# Patient Record
Sex: FEMALE | Race: WHITE | NOT HISPANIC OR LATINO | Employment: FULL TIME | URBAN - METROPOLITAN AREA
[De-identification: names, ages, dates, MRNs, and addresses within clinical notes are randomized per-mention and may not be internally consistent; named-entity substitution may affect disease eponyms.]

---

## 2017-03-24 ENCOUNTER — PRENATAL OFFICE VISIT (OUTPATIENT)
Dept: OBGYN | Facility: CLINIC | Age: 27
End: 2017-03-24
Payer: COMMERCIAL

## 2017-03-24 DIAGNOSIS — Z34.80 PRENATAL CARE, SUBSEQUENT PREGNANCY: Primary | ICD-10-CM

## 2017-03-24 PROCEDURE — 99207 ZZC NO CHARGE NURSE ONLY: CPT | Performed by: OBSTETRICS & GYNECOLOGY

## 2017-03-24 NOTE — MR AVS SNAPSHOT
After Visit Summary   3/24/2017    Jocelyn Causey    MRN: [id number]           Patient Information     Date Of Birth          1990        Visit Information        Provider Department      3/24/2017 3:08 PM Claudia Harvey MD [facility]        Today's Diagnoses     Prenatal care, subsequent pregnancy    -  1       Follow-ups after your visit        Your next 10 appointments already scheduled     Apr 27, 2017  9:30 AM CDT   New Prenatal with Claudia Harvey MD, [facility]   [facility] ([facility])    [address] [facility] [address]   [phone]              Who to contact     If you have questions or need follow up information about today's clinic visit or your schedule please contact [facility] directly at [phone].  Normal or non-critical lab and imaging results will be communicated to you by MyChart, letter or phone within 4 business days after the clinic has received the results. If you do not hear from us within 7 days, please contact the clinic through MyChart or phone. If you have a critical or abnormal lab result, we will notify you by phone as soon as possible.  Submit refill requests through Emefcy or call your pharmacy and they will forward the refill request to us. Please allow 3 business days for your refill to be completed.          Additional Information About Your Visit        MyChart Information     Emefcy gives you secure access to your electronic health record. If you see a primary care provider, you can also send messages to your care team and make appointments. If you have questions, please call your primary care clinic.  If you do not have a primary care provider, please call [phone] and they will assist you.        Care EveryWhere ID     This is your Care EveryWhere ID. This could be used by other organizations to access your [place] medical records  [id number]        Your Vitals  Were     Last Period                   02/06/2017            Blood Pressure from Last 3 Encounters:   06/22/15 114/82   05/17/15 136/84   05/14/15 (!) 133/96    Weight from Last 3 Encounters:   06/22/15 83.9 kg (185 lb)   05/14/15 101.8 kg (224 lb 6.4 oz)   05/07/15 99.8 kg (220 lb)              Today, you had the following     No orders found for display       Primary Care Provider Office Phone # Fax #    Mary Odilia York [phone]       [facility]  [address] [facility] [address]        Thank you!     Thank you for choosing [facility]  for your care. Our goal is always to provide you with excellent care. Hearing back from our patients is one way we can continue to improve our services. Please take a few minutes to complete the written survey that you may receive in the mail after your visit with us. Thank you!             Your Updated Medication List - Protect others around you: Learn how to safely use, store and throw away your medicines at www.disposemymeds.org.          This list is accurate as of: 3/24/17  3:19 PM.  Always use your most recent med list.                   Brand Name Dispense Instructions for use    prenatal multivitamin  plus iron 27-0.8 MG Tabs per tablet      Take 1 tablet by mouth daily       VITAMIN D (CHOLECALCIFEROL) PO      Take 1,000 Units by mouth daily

## 2017-04-27 ENCOUNTER — PRENATAL OFFICE VISIT (OUTPATIENT)
Dept: OBGYN | Facility: CLINIC | Age: 27
End: 2017-04-27
Payer: COMMERCIAL

## 2017-04-27 VITALS
HEIGHT: 69 IN | TEMPERATURE: 97.6 F | BODY MASS INDEX: 27.05 KG/M2 | WEIGHT: 182.6 LBS | SYSTOLIC BLOOD PRESSURE: 125 MMHG | DIASTOLIC BLOOD PRESSURE: 84 MMHG | HEART RATE: 91 BPM

## 2017-04-27 DIAGNOSIS — Z34.81 PRENATAL CARE, SUBSEQUENT PREGNANCY, FIRST TRIMESTER: Primary | ICD-10-CM

## 2017-04-27 LAB
ABO + RH BLD: NORMAL
ABO + RH BLD: NORMAL
ALBUMIN UR-MCNC: NEGATIVE MG/DL
APPEARANCE UR: ABNORMAL
BILIRUB UR QL STRIP: NEGATIVE
BLD GP AB SCN SERPL QL: NORMAL
BLOOD BANK CMNT PATIENT-IMP: NORMAL
COLOR UR AUTO: YELLOW
ERYTHROCYTE [DISTWIDTH] IN BLOOD BY AUTOMATED COUNT: 12.6 % (ref 10–15)
GLUCOSE UR STRIP-MCNC: NEGATIVE MG/DL
HBV SURFACE AG SERPL QL IA: NONREACTIVE
HCT VFR BLD AUTO: 40.4 % (ref 35–47)
HGB BLD-MCNC: 14.6 G/DL (ref 11.7–15.7)
HGB UR QL STRIP: ABNORMAL
HIV 1+2 AB+HIV1 P24 AG SERPL QL IA: NORMAL
KETONES UR STRIP-MCNC: ABNORMAL MG/DL
LEUKOCYTE ESTERASE UR QL STRIP: ABNORMAL
MCH RBC QN AUTO: 30.7 PG (ref 26.5–33)
MCHC RBC AUTO-ENTMCNC: 36.1 G/DL (ref 31.5–36.5)
MCV RBC AUTO: 85 FL (ref 78–100)
NITRATE UR QL: NEGATIVE
NON-SQ EPI CELLS #/AREA URNS LPF: ABNORMAL /LPF
PH UR STRIP: 5.5 PH (ref 5–7)
PLATELET # BLD AUTO: 323 10E9/L (ref 150–450)
RBC # BLD AUTO: 4.75 10E12/L (ref 3.8–5.2)
RBC #/AREA URNS AUTO: ABNORMAL /HPF (ref 0–2)
SP GR UR STRIP: 1.01 (ref 1–1.03)
SPECIMEN EXP DATE BLD: NORMAL
URATE CRY #/AREA URNS HPF: ABNORMAL /HPF
URN SPEC COLLECT METH UR: ABNORMAL
UROBILINOGEN UR STRIP-ACNC: 0.2 EU/DL (ref 0.2–1)
WBC # BLD AUTO: 13.9 10E9/L (ref 4–11)
WBC #/AREA URNS AUTO: ABNORMAL /HPF (ref 0–2)

## 2017-04-27 PROCEDURE — 86850 RBC ANTIBODY SCREEN: CPT | Performed by: OBSTETRICS & GYNECOLOGY

## 2017-04-27 PROCEDURE — 36415 COLL VENOUS BLD VENIPUNCTURE: CPT | Performed by: OBSTETRICS & GYNECOLOGY

## 2017-04-27 PROCEDURE — 87086 URINE CULTURE/COLONY COUNT: CPT | Performed by: OBSTETRICS & GYNECOLOGY

## 2017-04-27 PROCEDURE — 99207 ZZC FIRST OB VISIT: CPT | Performed by: OBSTETRICS & GYNECOLOGY

## 2017-04-27 PROCEDURE — 85027 COMPLETE CBC AUTOMATED: CPT | Performed by: OBSTETRICS & GYNECOLOGY

## 2017-04-27 PROCEDURE — 86900 BLOOD TYPING SEROLOGIC ABO: CPT | Performed by: OBSTETRICS & GYNECOLOGY

## 2017-04-27 PROCEDURE — 81001 URINALYSIS AUTO W/SCOPE: CPT | Performed by: OBSTETRICS & GYNECOLOGY

## 2017-04-27 PROCEDURE — 86780 TREPONEMA PALLIDUM: CPT | Performed by: OBSTETRICS & GYNECOLOGY

## 2017-04-27 PROCEDURE — 86901 BLOOD TYPING SEROLOGIC RH(D): CPT | Performed by: OBSTETRICS & GYNECOLOGY

## 2017-04-27 PROCEDURE — 87389 HIV-1 AG W/HIV-1&-2 AB AG IA: CPT | Performed by: OBSTETRICS & GYNECOLOGY

## 2017-04-27 PROCEDURE — 86762 RUBELLA ANTIBODY: CPT | Performed by: OBSTETRICS & GYNECOLOGY

## 2017-04-27 PROCEDURE — G0145 SCR C/V CYTO,THINLAYER,RESCR: HCPCS | Performed by: OBSTETRICS & GYNECOLOGY

## 2017-04-27 PROCEDURE — 87340 HEPATITIS B SURFACE AG IA: CPT | Performed by: OBSTETRICS & GYNECOLOGY

## 2017-04-27 NOTE — PROGRESS NOTES
"Joceyln is a 27 year old  @ 11.3 weeks here for new ob visit.  Planned pregnancy.        ROS: Ten point review of systems was reviewed and negative except the above.  Current Issues include: nausea, mild  fatigue    OBhx:  x 1  Gyne: Pap smears Normal  history of STD No STD history  Past Medical History:   Diagnosis Date     Chickenpox      Mild preeclampsia      Past Surgical History:   Procedure Laterality Date     LASIK       MOUTH SURGERY      wisdom teeth     Patient Active Problem List    Diagnosis Date Noted     Prenatal care, subsequent pregnancy 2017     Priority: Medium     Ventricular ectopic beats 2013     Priority: Medium     Documented by Holter; trial of abstinence from caffeine       CARDIOVASCULAR SCREENING; LDL GOAL LESS THAN 160 10/31/2010     Priority: Low      No Known Allergies    Current Outpatient Prescriptions on File Prior to Visit:  Prenatal Vit-Fe Fumarate-FA (PRENATAL MULTIVITAMIN  PLUS IRON) 27-0.8 MG TABS Take 1 tablet by mouth daily   VITAMIN D, CHOLECALCIFEROL, PO Take 1,000 Units by mouth daily     No current facility-administered medications on file prior to visit.     Past Medical History of Father of Baby:   No significant medical history    Physical Exam: /84 (BP Location: Left arm, Patient Position: Chair, Cuff Size: Adult Regular)  Pulse 91  Temp 97.6  F (36.4  C) (Oral)  Ht 5' 8.5\" (1.74 m)  Wt 182 lb 9.6 oz (82.8 kg)  LMP 2017  Breastfeeding? No  BMI 27.36 kg/m2  General: Well developed, well nourished female  Skin: Normal  HEENT: Normal  Neck: Supple,no adenopathy,thyroid normal  Chest: Clear  Heart: Regular rate, rhythm,No murmur, rub, gallop  Breasts: Not examined   Abdomen: Benign,Soft, flat, non-tender,No masses, organomegaly,No inguinal nodes,Bowel sounds normoactive   Extremities: Normal  Neurological: Normal   Perineum: Normal   Vulva: Normal  Vagina: Normal mucosa, no discharge  Cervix: Parous, closed, mobile, no " discharge  Uterus: 12 weeks, Normal shape, position and consistency   Adnexa: Normal  Rectum: deferred, Normal without lesion or mass   Bony Pelvis: Adequate     Transvaginal ultrasound was performed.  A viable intrauterine pregnancy was seen.  CRL consistent with 10 weeks, 6 days.  Fetal heart motion was visualized.    EDC by LMP: 17   EDC by sono:  17  Final EDC: 17    A/P 27 year old  at  11.3 weeks    1. Discussed physician coverage, tertiary support, diet, exercise, weight gain, schedule of visits, routine and indicated ultrasounds, and childbirth education.    2. Options for  testing for chromosomal and birth defects were discussed with the patient including nuchal lucency/blood marker testing in the first trimester and quad screening and/or Level 2 ultrasound in the second trimester.  We discussed that these are screening tests and not diagnostic tests and that false positives and negatives are a distinct possibility.  We discussed that follow up diagnostic testing would include chorionic villus sampling or amniocentesis depending on gestational age.   Planning on declining    3. Prenatal labs, pap    4. Prenatal Vitamins    Claudia Harvey M.D.

## 2017-04-27 NOTE — NURSING NOTE
"Chief Complaint   Patient presents with     Prenatal Care       Initial /84 (BP Location: Left arm, Patient Position: Chair, Cuff Size: Adult Regular)  Pulse 91  Temp 97.6  F (36.4  C) (Oral)  Ht 5' 8.5\" (1.74 m)  Wt 182 lb 9.6 oz (82.8 kg)  LMP 02/06/2017  Breastfeeding? No  BMI 27.36 kg/m2 Estimated body mass index is 27.36 kg/(m^2) as calculated from the following:    Height as of this encounter: 5' 8.5\" (1.74 m).    Weight as of this encounter: 182 lb 9.6 oz (82.8 kg).  Medication Reconciliation: complete   Micheline Moses CMA    "

## 2017-04-27 NOTE — MR AVS SNAPSHOT
"              After Visit Summary   4/27/2017    Jocelyn Causey    MRN: [id number]           Patient Information     Date Of Birth          1990        Visit Information        Provider Department      4/27/2017 9:30 AM Claudia Harvey MD; [facility]        Today's Diagnoses     Prenatal care, subsequent pregnancy, first trimester    -  1       Follow-ups after your visit        Who to contact     If you have questions or need follow up information about today's clinic visit or your schedule please contact [facility] directly at [phone].  Normal or non-critical lab and imaging results will be communicated to you by OnForcehart, letter or phone within 4 business days after the clinic has received the results. If you do not hear from us within 7 days, please contact the clinic through OnForcehart or phone. If you have a critical or abnormal lab result, we will notify you by phone as soon as possible.  Submit refill requests through Quack or call your pharmacy and they will forward the refill request to us. Please allow 3 business days for your refill to be completed.          Additional Information About Your Visit        MyChart Information     Quack gives you secure access to your electronic health record. If you see a primary care provider, you can also send messages to your care team and make appointments. If you have questions, please call your primary care clinic.  If you do not have a primary care provider, please call [phone] and they will assist you.        Care EveryWhere ID     This is your Care EveryWhere ID. This could be used by other organizations to access your [place] medical records  [id number]        Your Vitals Were     Pulse Temperature Height Last Period Breastfeeding? BMI (Body Mass Index)    91 97.6  F (36.4  C) (Oral) 5' 8.5\" (1.74 m) 02/06/2017 No 27.36 kg/m2       Blood Pressure from Last 3 Encounters:   04/27/17 125/84   06/22/15 " 114/82   05/17/15 136/84    Weight from Last 3 Encounters:   04/27/17 182 lb 9.6 oz (82.8 kg)   06/22/15 185 lb (83.9 kg)   05/14/15 224 lb 6.4 oz (101.8 kg)              We Performed the Following     *UA reflex to Microscopic     ABO/Rh type and screen     Anti Treponema     CBC with platelets     Hepatitis B surface antigen     HIV Antigen Antibody Combo     Pap imaged thin layer screen reflex to HPV if ASCUS - recommend age 25 - 29     Rubella Antibody IgG Quantitative     Urine Culture Aerobic Bacterial     US OB <14 Weeks w Transvaginal Single        Primary Care Provider Office Phone # Fax #    Mary Odilia York [phone]       [facility]  [address] [facility] [address]        Thank you!     Thank you for choosing [facility]  for your care. Our goal is always to provide you with excellent care. Hearing back from our patients is one way we can continue to improve our services. Please take a few minutes to complete the written survey that you may receive in the mail after your visit with us. Thank you!             Your Updated Medication List - Protect others around you: Learn how to safely use, store and throw away your medicines at www.disposemymeds.org.          This list is accurate as of: 4/27/17 10:02 AM.  Always use your most recent med list.                   Brand Name Dispense Instructions for use    prenatal multivitamin  plus iron 27-0.8 MG Tabs per tablet      Take 1 tablet by mouth daily       VITAMIN D (CHOLECALCIFEROL) PO      Take 1,000 Units by mouth daily

## 2017-04-28 LAB
COPATH REPORT: NORMAL
PAP: NORMAL
RUBV IGG SERPL IA-ACNC: 15 IU/ML
T PALLIDUM IGG+IGM SER QL: NEGATIVE

## 2017-04-29 LAB
BACTERIA SPEC CULT: NORMAL
MICRO REPORT STATUS: NORMAL
SPECIMEN SOURCE: NORMAL

## 2017-05-23 ENCOUNTER — PRENATAL OFFICE VISIT (OUTPATIENT)
Dept: OBGYN | Facility: CLINIC | Age: 27
End: 2017-05-23
Payer: COMMERCIAL

## 2017-05-23 VITALS
SYSTOLIC BLOOD PRESSURE: 128 MMHG | HEIGHT: 69 IN | DIASTOLIC BLOOD PRESSURE: 89 MMHG | HEART RATE: 87 BPM | BODY MASS INDEX: 27.55 KG/M2 | WEIGHT: 186 LBS

## 2017-05-23 DIAGNOSIS — Z34.81 PRENATAL CARE, SUBSEQUENT PREGNANCY, FIRST TRIMESTER: Primary | ICD-10-CM

## 2017-05-23 PROCEDURE — 99207 ZZC PRENATAL VISIT: CPT | Performed by: OBSTETRICS & GYNECOLOGY

## 2017-05-23 NOTE — PROGRESS NOTES
Doing well.  Not sure about if having FM yet.  H/o gHTN, didn't come on until later in pregnancy with swelling and HAs, labs were always normal.    27 year old  at 15w1d   - anatomy US ordered    RTC 4 weeks    Anita Gagnon MD, MPH  [facility]

## 2017-05-23 NOTE — MR AVS SNAPSHOT
After Visit Summary   5/23/2017    Jocelyn Causey    MRN: [id number]           Patient Information     Date Of Birth          1990        Visit Information        Provider Department      5/23/2017 9:00 AM Anita Gagnon MD [facility]        Today's Diagnoses     Prenatal care, subsequent pregnancy, first trimester    -  1       Follow-ups after your visit        Follow-up notes from your care team     Return in about 4 weeks (around 6/20/2017).      Future tests that were ordered for you today     Open Future Orders        Priority Expected Expires Ordered    US OB > 14 Weeks Complete Single Routine 5/23/2017 5/23/2018 5/23/2017            Who to contact     If you have questions or need follow up information about today's clinic visit or your schedule please contact [facility] directly at [phone].  Normal or non-critical lab and imaging results will be communicated to you by MyChart, letter or phone within 4 business days after the clinic has received the results. If you do not hear from us within 7 days, please contact the clinic through Siastohart or phone. If you have a critical or abnormal lab result, we will notify you by phone as soon as possible.  Submit refill requests through [employer] or call your pharmacy and they will forward the refill request to us. Please allow 3 business days for your refill to be completed.          Additional Information About Your Visit        MyChart Information     [employer] gives you secure access to your electronic health record. If you see a primary care provider, you can also send messages to your care team and make appointments. If you have questions, please call your primary care clinic.  If you do not have a primary care provider, please call [phone] and they will assist you.        Care EveryWhere ID     This is your Care EveryWhere ID. This could be used by other organizations to access your [facility]  "records  [id number]        Your Vitals Were     Pulse Height Last Period Breastfeeding? BMI (Body Mass Index)       87 5' 8.5\" (1.74 m) 02/06/2017 No 27.87 kg/m2        Blood Pressure from Last 3 Encounters:   05/23/17 128/89   04/27/17 125/84   06/22/15 114/82    Weight from Last 3 Encounters:   05/23/17 186 lb (84.4 kg)   04/27/17 182 lb 9.6 oz (82.8 kg)   06/22/15 185 lb (83.9 kg)               Primary Care Provider Office Phone # Fax #    Mary Odilia York [phone]       [address] [address]        Thank you!     Thank you for choosing [facility]  for your care. Our goal is always to provide you with excellent care. Hearing back from our patients is one way we can continue to improve our services. Please take a few minutes to complete the written survey that you may receive in the mail after your visit with us. Thank you!             Your Updated Medication List - Protect others around you: Learn how to safely use, store and throw away your medicines at www.disposemymeds.org.          This list is accurate as of: 5/23/17  9:49 AM.  Always use your most recent med list.                   Brand Name Dispense Instructions for use    prenatal multivitamin  plus iron 27-0.8 MG Tabs per tablet      Take 1 tablet by mouth daily       VITAMIN D (CHOLECALCIFEROL) PO      Take 1,000 Units by mouth daily         "

## 2017-05-23 NOTE — NURSING NOTE
"Chief Complaint   Patient presents with     Prenatal Care       Initial /89 (BP Location: Right arm, Patient Position: Chair, Cuff Size: Adult Regular)  Pulse 87  Ht 5' 8.5\" (1.74 m)  Wt 186 lb (84.4 kg)  LMP 02/06/2017  Breastfeeding? No  BMI 27.87 kg/m2 Estimated body mass index is 27.87 kg/(m^2) as calculated from the following:    Height as of this encounter: 5' 8.5\" (1.74 m).    Weight as of this encounter: 186 lb (84.4 kg).  Medication Reconciliation: complete   Micheline Moses CMA      "

## 2017-06-23 ENCOUNTER — PRENATAL OFFICE VISIT (OUTPATIENT)
Dept: OBGYN | Facility: CLINIC | Age: 27
End: 2017-06-23
Payer: COMMERCIAL

## 2017-06-23 ENCOUNTER — HOSPITAL ENCOUNTER (OUTPATIENT)
Dept: ULTRASOUND IMAGING | Facility: CLINIC | Age: 27
Discharge: HOME | End: 2017-06-23
Attending: OBSTETRICS & GYNECOLOGY | Admitting: OBSTETRICS & GYNECOLOGY
Payer: COMMERCIAL

## 2017-06-23 VITALS
DIASTOLIC BLOOD PRESSURE: 77 MMHG | BODY MASS INDEX: 28.32 KG/M2 | HEART RATE: 89 BPM | WEIGHT: 189 LBS | SYSTOLIC BLOOD PRESSURE: 111 MMHG

## 2017-06-23 DIAGNOSIS — Z34.81 PRENATAL CARE, SUBSEQUENT PREGNANCY, FIRST TRIMESTER: ICD-10-CM

## 2017-06-23 DIAGNOSIS — Z34.82 PRENATAL CARE, SUBSEQUENT PREGNANCY, SECOND TRIMESTER: Primary | ICD-10-CM

## 2017-06-23 PROCEDURE — 99207 ZZC PRENATAL VISIT: CPT | Performed by: OBSTETRICS & GYNECOLOGY

## 2017-06-23 PROCEDURE — 76805 OB US >/= 14 WKS SNGL FETUS: CPT

## 2017-06-23 RX ORDER — CETIRIZINE HYDROCHLORIDE 10 MG/1
10 TABLET ORAL DAILY
COMMUNITY
End: 2022-04-04

## 2017-06-23 NOTE — MR AVS SNAPSHOT
After Visit Summary   6/23/2017    Jocelyn Causey    MRN: [id number]           Patient Information     Date Of Birth          1990        Visit Information        Provider Department      6/23/2017 10:30 AM Arianna Peng MD [facility]        Today's Diagnoses     Prenatal care, subsequent pregnancy, second trimester    -  1       Follow-ups after your visit        Future tests that were ordered for you today     Open Future Orders        Priority Expected Expires Ordered    OB hemoglobin Routine  9/23/2017 6/23/2017    Glucose tolerance gest screen 1 hour Routine  9/23/2017 6/23/2017    Anti Treponema Routine  9/23/2017 6/23/2017            Who to contact     If you have questions or need follow up information about today's clinic visit or your schedule please contact [facility] directly at [phone].  Normal or non-critical lab and imaging results will be communicated to you by MyChart, letter or phone within 4 business days after the clinic has received the results. If you do not hear from us within 7 days, please contact the clinic through Bicycle Therapeuticshart or phone. If you have a critical or abnormal lab result, we will notify you by phone as soon as possible.  Submit refill requests through eduPad or call your pharmacy and they will forward the refill request to us. Please allow 3 business days for your refill to be completed.          Additional Information About Your Visit        MyChart Information     eduPad gives you secure access to your electronic health record. If you see a primary care provider, you can also send messages to your care team and make appointments. If you have questions, please call your primary care clinic.  If you do not have a primary care provider, please call [phone] and they will assist you.        Care EveryWhere ID     This is your Care EveryWhere ID. This could be used by other organizations to access your [place]  medical records  [id number]        Your Vitals Were     Pulse Last Period Breastfeeding? BMI (Body Mass Index)          89 02/06/2017 No 28.32 kg/m2         Blood Pressure from Last 3 Encounters:   06/23/17 111/77   05/23/17 128/89   04/27/17 125/84    Weight from Last 3 Encounters:   06/23/17 189 lb (85.7 kg)   05/23/17 186 lb (84.4 kg)   04/27/17 182 lb 9.6 oz (82.8 kg)               Primary Care Provider Office Phone # Fax #    Mary Odilia York [phone]       [facility] [address] [facility] [address]        Equal Access to Services     MITZY BROWN : Lizz Valdovinos, wajuan mane, qaybta kaalmaorin meyer, marta francois . So [facility] [phone].    ATENCIÓN: Si habla español, tiene a dotson disposición servicios gratuitos de asistencia lingüística. Evgeny al [phone].    We comply with applicable federal civil rights laws and [place] laws. We do not discriminate on the basis of race, color, national origin, age, disability sex, sexual orientation or gender identity.            Thank you!     Thank you for choosing [facility]  for your care. Our goal is always to provide you with excellent care. Hearing back from our patients is one way we can continue to improve our services. Please take a few minutes to complete the written survey that you may receive in the mail after your visit with us. Thank you!             Your Updated Medication List - Protect others around you: Learn how to safely use, store and throw away your medicines at www.disposemymeds.org.          This list is accurate as of: 6/23/17 10:30 AM.  Always use your most recent med list.                   Brand Name Dispense Instructions for use Diagnosis    cetirizine 10 MG tablet    zyrTEC     Take 10 mg by mouth daily        prenatal multivitamin  plus iron 27-0.8 MG Tabs per tablet      Take 1 tablet by mouth daily        VITAMIN D (CHOLECALCIFEROL)  PO      Take 1,000 Units by mouth daily

## 2017-06-23 NOTE — PROGRESS NOTES
Doing well.   Reports feeling occasional side discomforts especially with sudden movements.   Just returned from her anatomy US.   Denies VB, ctx, LOF. +FM  /73 (BP Location: Right arm, Patient Position: Chair, Cuff Size: Adult Regular)  Pulse 90  Wt 189 lb (85.7 kg)  LMP 2017  Breastfeeding? No  BMI 28.32 kg/m2   /77 (BP Location: Left arm, Patient Position: Chair, Cuff Size: Adult Large)  Pulse 89  Wt 189 lb (85.7 kg)  LMP 2017  Breastfeeding? No  BMI 28.32 kg/m2  General Appearance: NAD  Abdomen: Gravid, NT  Refer to flow sheet above.   A/P: 27 year old  at 19w4d  -- concerns addressed above; reassurance provided; likely round ligament discomfort; conservative measures reviewed  -- fetal anatomy US report pending; is having a a Girl!!!  -- SAB precautions reviewed  RTC in 4 weeks    Arianna Peng MD  [facility]

## 2017-06-23 NOTE — PROGRESS NOTES
Jocelyn  Your results are normal.  If you have any other questions or concerns, please followup in the office or contact us on mychart or evisit.    Ysabel Granado

## 2017-06-23 NOTE — NURSING NOTE
"Initial /73 (BP Location: Right arm, Patient Position: Chair, Cuff Size: Adult Regular)  Pulse 90  Wt 189 lb (85.7 kg)  LMP 02/06/2017  Breastfeeding? No  BMI 28.32 kg/m2 Estimated body mass index is 28.32 kg/(m^2) as calculated from the following:    Height as of 5/23/17: 5' 8.5\" (1.74 m).    Weight as of this encounter: 189 lb (85.7 kg). .    Mishel Cooper      "

## 2017-07-26 ENCOUNTER — PRENATAL OFFICE VISIT (OUTPATIENT)
Dept: OBGYN | Facility: CLINIC | Age: 27
End: 2017-07-26
Payer: COMMERCIAL

## 2017-07-26 VITALS
HEART RATE: 81 BPM | BODY MASS INDEX: 29.03 KG/M2 | HEIGHT: 69 IN | WEIGHT: 196 LBS | SYSTOLIC BLOOD PRESSURE: 113 MMHG | DIASTOLIC BLOOD PRESSURE: 77 MMHG

## 2017-07-26 DIAGNOSIS — Z34.82 PRENATAL CARE, SUBSEQUENT PREGNANCY, SECOND TRIMESTER: Primary | ICD-10-CM

## 2017-07-26 DIAGNOSIS — Z34.82 PRENATAL CARE, SUBSEQUENT PREGNANCY, SECOND TRIMESTER: ICD-10-CM

## 2017-07-26 LAB
GLUCOSE 1H P 50 G GLC PO SERPL-MCNC: 131 MG/DL (ref 60–129)
HGB BLD-MCNC: 11.7 G/DL (ref 11.7–15.7)

## 2017-07-26 PROCEDURE — 99207 ZZC PRENATAL VISIT: CPT | Performed by: OBSTETRICS & GYNECOLOGY

## 2017-07-26 PROCEDURE — 36415 COLL VENOUS BLD VENIPUNCTURE: CPT | Performed by: OBSTETRICS & GYNECOLOGY

## 2017-07-26 PROCEDURE — 82950 GLUCOSE TEST: CPT | Performed by: OBSTETRICS & GYNECOLOGY

## 2017-07-26 PROCEDURE — 86780 TREPONEMA PALLIDUM: CPT | Performed by: OBSTETRICS & GYNECOLOGY

## 2017-07-26 PROCEDURE — 00000218 ZZHCL STATISTIC OBHBG - HEMOGLOBIN: Performed by: OBSTETRICS & GYNECOLOGY

## 2017-07-26 NOTE — PROGRESS NOTES
"Doing well.   Concerned about her weight gain.   Denies VB, ctx, LOF. +FM  /77 (BP Location: Right arm, Cuff Size: Adult Regular)  Pulse 81  Ht 5' 8.5\" (1.74 m)  Wt 196 lb (88.9 kg)  LMP 2017  BMI 29.37 kg/m2  General Appearance: NAD  Abdomen: Gravid, NT  Refer to flow sheet above.   A/P: 27 year old  at 24w2d  -- concerns addressed above, reassurance provided  -- reviewed normal fetal anatomy US  -- 1 hr GCT, OB Hgb and syphilis testing today  -- PTL precautions reviewed  RTC in 4 weeks    Arianna Peng MD  [facility]            "

## 2017-07-26 NOTE — MR AVS SNAPSHOT
After Visit Summary   7/26/2017    Jocelyn Causey    MRN: [id number]           Patient Information     Date Of Birth          1990        Visit Information        Provider Department      7/26/2017 9:45 AM Arianna Peng MD [facility]        Today's Diagnoses     Prenatal care, subsequent pregnancy, second trimester    -  1       Follow-ups after your visit        Your next 10 appointments already scheduled     Aug 21, 2017  4:30 PM CDT   ESTABLISHED PRENATAL with Claudia Harvey MD   [facility]    [address] [address]   [phone]            Sep 05, 2017  9:00 AM CDT   ESTABLISHED PRENATAL with Arianna Peng MD   [facility]    [address] [address]   [phone]            Sep 18, 2017  9:00 AM CDT   ESTABLISHED PRENATAL with Juliette Davenport MD   [facility]    [address] [address]   [phone]              Who to contact     If you have questions or need follow up information about today's clinic visit or your schedule please contact [facility] directly at [phone].  Normal or non-critical lab and imaging results will be communicated to you by MyChart, letter or phone within 4 business days after the clinic has received the results. If you do not hear from us within 7 days, please contact the clinic through MyChart or phone. If you have a critical or abnormal lab result, we will notify you by phone as soon as possible.  Submit refill requests through [employer] or call your pharmacy and they will forward the refill request to us. Please allow 3 business days for your refill to be completed.          Additional Information About Your Visit        [employer] Information     [employer] gives you secure access to your electronic health  "record. If you see a primary care provider, you can also send messages to your care team and make appointments. If you have questions, please call your primary care clinic.  If you do not have a primary care provider, please call [phone] and they will assist you.        Care EveryWhere ID     This is your Care EveryWhere ID. This could be used by other organizations to access your [place] medical records  [id number]        Your Vitals Were     Pulse Height Last Period BMI (Body Mass Index)          81 5' 8.5\" (1.74 m) 02/06/2017 29.37 kg/m2         Blood Pressure from Last 3 Encounters:   07/26/17 113/77   06/23/17 111/77   05/23/17 128/89    Weight from Last 3 Encounters:   07/26/17 196 lb (88.9 kg)   06/23/17 189 lb (85.7 kg)   05/23/17 186 lb (84.4 kg)              Today, you had the following     No orders found for display       Primary Care Provider Office Phone # Fax #    Mary Odilia York [phone]       [facility]  [address] [address]        Equal Access to Services     [facility] : Hadii lavon Valdovinos, wajnda antonietta, qaybta kaalmada mitch, marta francois . So [facility] [phone].    ATENCIÓN: Si habla español, tiene a dotson disposición servicios gratuitos de asistencia lingüística. [facility] [phone].    We comply with applicable federal civil rights laws and [place] laws. We do not discriminate on the basis of race, color, national origin, age, disability sex, sexual orientation or gender identity.            Thank you!     Thank you for choosing [facility]  for your care. Our goal is always to provide you with excellent care. Hearing back from our patients is one way we can continue to improve our services. Please take a few minutes to complete the written survey that you may receive in the mail after your visit with us. Thank you!             Your Updated Medication List - Protect others " around you: Learn how to safely use, store and throw away your medicines at www.disposemymeds.org.          This list is accurate as of: 7/26/17  9:52 AM.  Always use your most recent med list.                   Brand Name Dispense Instructions for use Diagnosis    cetirizine 10 MG tablet    zyrTEC     Take 10 mg by mouth daily        prenatal multivitamin  plus iron 27-0.8 MG Tabs per tablet      Take 1 tablet by mouth daily        VITAMIN D (CHOLECALCIFEROL) PO      Take 1,000 Units by mouth daily

## 2017-07-26 NOTE — NURSING NOTE
"Initial /77 (BP Location: Right arm, Cuff Size: Adult Regular)  Pulse 81  Ht 5' 8.5\" (1.74 m)  Wt 196 lb (88.9 kg)  LMP 02/06/2017  BMI 29.37 kg/m2 Estimated body mass index is 29.37 kg/(m^2) as calculated from the following:    Height as of this encounter: 5' 8.5\" (1.74 m).    Weight as of this encounter: 196 lb (88.9 kg). .      "

## 2017-07-27 LAB — T PALLIDUM IGG+IGM SER QL: NEGATIVE

## 2017-07-27 NOTE — PROGRESS NOTES
Call to pt to notify of below.  Unable to reach.  Left message for pt to call back     Ayleen Williamson   [facility]  RN

## 2017-07-27 NOTE — PROGRESS NOTES
Inform patient that she did not pass her 1 hour glucose challenge test. She is to proceed with the 3 hour glucose tolerance test. Please lace order.   Her hemoglobin returned normal, as such supplemental iron is not needed.   Syphilis testing is pending.     Arianna Peng MD  [facility]

## 2017-07-27 NOTE — PROGRESS NOTES
Syphilis testing is negative.   Will review at next follow-up visit.     Arianna Pneg MD  [facility]

## 2017-08-04 DIAGNOSIS — Z34.82 PRENATAL CARE, SUBSEQUENT PREGNANCY, SECOND TRIMESTER: ICD-10-CM

## 2017-08-04 LAB
GLUCOSE 1H P 100 G GLC PO SERPL-MCNC: 131 MG/DL (ref 60–179)
GLUCOSE 2H P 100 G GLC PO SERPL-MCNC: 94 MG/DL (ref 60–154)
GLUCOSE 3H P 100 G GLC PO SERPL-MCNC: 82 MG/DL (ref 60–139)
GLUCOSE P FAST SERPL-MCNC: 81 MG/DL (ref 60–94)

## 2017-08-04 PROCEDURE — 36415 COLL VENOUS BLD VENIPUNCTURE: CPT | Performed by: OBSTETRICS & GYNECOLOGY

## 2017-08-04 PROCEDURE — 82951 GLUCOSE TOLERANCE TEST (GTT): CPT | Performed by: OBSTETRICS & GYNECOLOGY

## 2017-08-04 PROCEDURE — 82952 GTT-ADDED SAMPLES: CPT | Performed by: OBSTETRICS & GYNECOLOGY

## 2017-08-07 NOTE — PROGRESS NOTES
Great news.   You passed your 3 hour glucose tolerance test.   No further testing is needed.     Arianna Peng. MD  [facility]

## 2017-08-21 ENCOUNTER — PRENATAL OFFICE VISIT (OUTPATIENT)
Dept: OBGYN | Facility: CLINIC | Age: 27
End: 2017-08-21
Payer: COMMERCIAL

## 2017-08-21 VITALS
HEART RATE: 82 BPM | HEIGHT: 69 IN | BODY MASS INDEX: 30.27 KG/M2 | WEIGHT: 204.4 LBS | SYSTOLIC BLOOD PRESSURE: 132 MMHG | DIASTOLIC BLOOD PRESSURE: 74 MMHG

## 2017-08-21 DIAGNOSIS — Z34.83 PRENATAL CARE, SUBSEQUENT PREGNANCY, THIRD TRIMESTER: Primary | ICD-10-CM

## 2017-08-21 DIAGNOSIS — O26.893 RH NEGATIVE STATE IN ANTEPARTUM PERIOD, THIRD TRIMESTER: ICD-10-CM

## 2017-08-21 DIAGNOSIS — Z67.91 RH NEGATIVE STATE IN ANTEPARTUM PERIOD, THIRD TRIMESTER: ICD-10-CM

## 2017-08-21 DIAGNOSIS — Z23 NEED FOR TDAP VACCINATION: ICD-10-CM

## 2017-08-21 PROCEDURE — 90715 TDAP VACCINE 7 YRS/> IM: CPT | Performed by: OBSTETRICS & GYNECOLOGY

## 2017-08-21 PROCEDURE — 99207 ZZC PRENATAL VISIT: CPT | Performed by: OBSTETRICS & GYNECOLOGY

## 2017-08-21 PROCEDURE — 96372 THER/PROPH/DIAG INJ SC/IM: CPT | Performed by: OBSTETRICS & GYNECOLOGY

## 2017-08-21 PROCEDURE — 90471 IMMUNIZATION ADMIN: CPT | Performed by: OBSTETRICS & GYNECOLOGY

## 2017-08-21 NOTE — NURSING NOTE
"Chief Complaint   Patient presents with     Prenatal Care     round ligament pain       Initial /74 (BP Location: Right arm, Patient Position: Chair, Cuff Size: Adult Regular)  Pulse 82  Ht 5' 8.5\" (1.74 m)  Wt 204 lb 6.4 oz (92.7 kg)  LMP 02/06/2017  Breastfeeding? No  BMI 30.63 kg/m2 Estimated body mass index is 30.63 kg/(m^2) as calculated from the following:    Height as of this encounter: 5' 8.5\" (1.74 m).    Weight as of this encounter: 204 lb 6.4 oz (92.7 kg).  Medication Reconciliation: complete   Praveena Gusman CMA      "

## 2017-08-21 NOTE — MR AVS SNAPSHOT
After Visit Summary   8/21/2017    Jocelyn Causey    MRN: [id number]           Patient Information     Date Of Birth          1990        Visit Information        Provider Department      8/21/2017 4:30 PM Claudia Harvey MD [facility]        Today's Diagnoses     Prenatal care, subsequent pregnancy, third trimester    -  1    Rh negative state in antepartum period, third trimester        Need for Tdap vaccination           Follow-ups after your visit        Your next 10 appointments already scheduled     Sep 05, 2017  9:00 AM CDT   ESTABLISHED PRENATAL with Arianna Peng MD   [facility]    [address] [address]   [phone]            Sep 18, 2017  9:00 AM CDT   ESTABLISHED PRENATAL with Juliette Davenport MD   [facility]    [address] [address]   [phone]              Who to contact     If you have questions or need follow up information about today's clinic visit or your schedule please contact [facility] directly at [phone].  Normal or non-critical lab and imaging results will be communicated to you by MyChart, letter or phone within 4 business days after the clinic has received the results. If you do not hear from us within 7 days, please contact the clinic through [employer] or phone. If you have a critical or abnormal lab result, we will notify you by phone as soon as possible.  Submit refill requests through [employer] or call your pharmacy and they will forward the refill request to us. Please allow 3 business days for your refill to be completed.          Additional Information About Your Visit        MyChart Information     [employer] gives you secure access to your electronic health record. If you see a primary care provider, you can also send messages to your care team and make appointments. If you have  "questions, please call your primary care clinic.  If you do not have a primary care provider, please call [phone] and they will assist you.        Care EveryWhere ID     This is your Care EveryWhere ID. This could be used by other organizations to access your [place] medical records  [id number]        Your Vitals Were     Pulse Height Last Period Breastfeeding? BMI (Body Mass Index)       82 5' 8.5\" (1.74 m) 02/06/2017 No 30.63 kg/m2        Blood Pressure from Last 3 Encounters:   08/21/17 132/74   07/26/17 113/77   06/23/17 111/77    Weight from Last 3 Encounters:   08/21/17 204 lb 6.4 oz (92.7 kg)   07/26/17 196 lb (88.9 kg)   06/23/17 189 lb (85.7 kg)              We Performed the Following     ADMIN 1st VACCINE     ADMIN CHARGE: Injection Intramuscular or Sub-Q (63456)     MED CHARGE: RH Immune Globulin 300 MCG  ()     TDAP VACCINE (ADACEL)          Today's Medication Changes          These changes are accurate as of: 8/21/17  4:55 PM.  If you have any questions, ask your nurse or doctor.               Start taking these medicines.        Dose/Directions    rho(D) immune globulin 300 MCG injection   Commonly known as:  HYPERRHO/RHOGAM   Used for:  Prenatal care, subsequent pregnancy, third trimester, Rh negative state in antepartum period, third trimester   Started by:  Claudia Harvey MD        Dose:  300 mcg   Inject 300 mcg into the muscle once for 1 dose   Quantity:  1 each   Refills:  0            Where to get your medicines      Some of these will need a paper prescription and others can be bought over the counter.  Ask your nurse if you have questions.     You don't need a prescription for these medications     rho(D) immune globulin 300 MCG injection                Primary Care Provider Office Phone # Fax #    Maryse Odilia York [phone]       [facility] [address] [address]        Equal Access to Services     MITZY BROWN AH: Lizz tineo " shanika Valdovinos, brian magdalenoradhaha, qaarelyta kacarlos a bonizoila, marta briin hayaarobert plataammy jiachemajackie jacoboNorajamal jony. So [facility] [phone].    ATENCIÓN: Si habla español, tiene a dotson disposición servicios gratuitos de asistencia lingüística. Evgeny al [phone].    We comply with applicable federal civil rights laws and [place] laws. We do not discriminate on the basis of race, color, national origin, age, disability sex, sexual orientation or gender identity.            Thank you!     Thank you for choosing [facility]  for your care. Our goal is always to provide you with excellent care. Hearing back from our patients is one way we can continue to improve our services. Please take a few minutes to complete the written survey that you may receive in the mail after your visit with us. Thank you!             Your Updated Medication List - Protect others around you: Learn how to safely use, store and throw away your medicines at www.disposemymeds.org.          This list is accurate as of: 8/21/17  4:55 PM.  Always use your most recent med list.                   Brand Name Dispense Instructions for use Diagnosis    cetirizine 10 MG tablet    zyrTEC     Take 10 mg by mouth daily        prenatal multivitamin plus iron 27-0.8 MG Tabs per tablet      Take 1 tablet by mouth daily        rho(D) immune globulin 300 MCG injection    HYPERRHO/RHOGAM    1 each    Inject 300 mcg into the muscle once for 1 dose    Prenatal care, subsequent pregnancy, third trimester, Rh negative state in antepartum period, third trimester       TYLENOL PO           VITAMIN D (CHOLECALCIFEROL) PO      Take 1,000 Units by mouth daily

## 2017-08-21 NOTE — NURSING NOTE
Screening Questionnaire for Adult Immunization    Are you sick today?   No   Do you have allergies to medications, food, a vaccine component or latex?   No   Have you ever had a serious reaction after receiving a vaccination?   No   Do you have a long-term health problem with heart disease, lung disease, asthma, kidney disease, metabolic disease (e.g. diabetes), anemia, or other blood disorder?   No   Do you have cancer, leukemia, HIV/AIDS, or any other immune system problem?   No   In the past 3 months, have you taken medications that affect  your immune system, such as prednisone, other steroids, or anticancer drugs; drugs for the treatment of rheumatoid arthritis, Crohn s disease, or psoriasis; or have you had radiation treatments?   No   Have you had a seizure, or a brain or other nervous system problem?   No   During the past year, have you received a transfusion of blood or blood     products, or been given immune (gamma) globulin or antiviral drug?   No   For women: Are you pregnant or is there a chance you could become        pregnant during the next month?   YES   Have you received any vaccinations in the past 4 weeks?   No     Prior to injection verified patient identity using patient's name and date of birth.         Per orders of Dr. Harvey, injection of TDAP given by Praveena Gusman. Patient instructed to remain in clinic for 15 minutes afterwards, and to report any adverse reaction to me immediately.       Screening performed by Praveena Gusman on 8/21/2017 at 4:22 PM.

## 2017-08-21 NOTE — PROGRESS NOTES
"CC: Here for routine prenatal visit @ 28w0d   HPI: + FM, no ctx, no LOF, no VB.  No complaints.     PE: /74 (BP Location: Right arm, Patient Position: Chair, Cuff Size: Adult Regular)  Pulse 82  Ht 5' 8.5\" (1.74 m)  Wt 204 lb 6.4 oz (92.7 kg)  LMP 2017  Breastfeeding? No  BMI 30.63 kg/m2   See OB flowsheet    Normal 3 GTT    A/P  @ 28w0d normal pregnancy, Rh negative    1. Routine prenatal care. Rhogam for Rh negative.  Tdap given.  Reviewed aches/pains of pregnancy.    RTC 2-4 weeks.      Claudia Harvey M.D.    "

## 2017-09-05 ENCOUNTER — PRENATAL OFFICE VISIT (OUTPATIENT)
Dept: OBGYN | Facility: CLINIC | Age: 27
End: 2017-09-05
Payer: COMMERCIAL

## 2017-09-05 VITALS
WEIGHT: 209 LBS | SYSTOLIC BLOOD PRESSURE: 115 MMHG | DIASTOLIC BLOOD PRESSURE: 73 MMHG | HEART RATE: 78 BPM | BODY MASS INDEX: 31.32 KG/M2

## 2017-09-05 DIAGNOSIS — Z34.83 PRENATAL CARE, SUBSEQUENT PREGNANCY, THIRD TRIMESTER: Primary | ICD-10-CM

## 2017-09-05 PROCEDURE — 99207 ZZC PRENATAL VISIT: CPT | Performed by: OBSTETRICS & GYNECOLOGY

## 2017-09-05 NOTE — NURSING NOTE
"Initial /73 (BP Location: Right arm, Patient Position: Chair, Cuff Size: Adult Regular)  Pulse 78  Wt 209 lb (94.8 kg)  LMP 02/06/2017  Breastfeeding? No  BMI 31.32 kg/m2 Estimated body mass index is 31.32 kg/(m^2) as calculated from the following:    Height as of 8/21/17: 5' 8.5\" (1.74 m).    Weight as of this encounter: 209 lb (94.8 kg). .    Mishel Cooper    "

## 2017-09-05 NOTE — MR AVS SNAPSHOT
After Visit Summary   9/5/2017    Jocelyn Causey    MRN: [id number]           Patient Information     Date Of Birth          1990        Visit Information        Provider Department      9/5/2017 9:00 AM Arianna Peng MD [facility]        Today's Diagnoses     Prenatal care, subsequent pregnancy, third trimester    -  1       Follow-ups after your visit        Your next 10 appointments already scheduled     Sep 18, 2017  9:00 AM CDT   ESTABLISHED PRENATAL with Juliette Davenport MD   [facility] ([facility]    [address], [place] [address]   [phone]              Who to contact     If you have questions or need follow up information about today's clinic visit or your schedule please contact [facility] directly at [phone].  Normal or non-critical lab and imaging results will be communicated to you by MyChart, letter or phone within 4 business days after the clinic has received the results. If you do not hear from us within 7 days, please contact the clinic through MyChart or phone. If you have a critical or abnormal lab result, we will notify you by phone as soon as possible.  Submit refill requests through [employer] or call your pharmacy and they will forward the refill request to us. Please allow 3 business days for your refill to be completed.          Additional Information About Your Visit        MyChart Information     [employer] gives you secure access to your electronic health record. If you see a primary care provider, you can also send messages to your care team and make appointments. If you have questions, please call your primary care clinic.  If you do not have a primary care provider, please call [phone] and they will assist you.        Care EveryWhere ID     This is your Care EveryWhere ID. This could be used by other organizations to access your [facility]  records  [id number]        Your Vitals Were     Pulse Last Period Breastfeeding? BMI (Body Mass Index)          78 02/06/2017 No 31.32 kg/m2         Blood Pressure from Last 3 Encounters:   09/05/17 115/73   08/21/17 132/74   07/26/17 113/77    Weight from Last 3 Encounters:   09/05/17 209 lb (94.8 kg)   08/21/17 204 lb 6.4 oz (92.7 kg)   07/26/17 196 lb (88.9 kg)              Today, you had the following     No orders found for display       Primary Care Provider Office Phone # Fax #    Mary Odilia York [phone]       [facility]  [address] [address]        Equal Access to Services     MITZY BROWN : Lizz Valdovinos, wajuan luqmarv, qaybta kaalmada adezoila, marta francois . So [facility] [phone].    ATENCIÓN: Si habla español, tiene a dotson disposición servicios gratuitos de asistencia lingüística. Llame al [phone].    We comply with applicable federal civil rights laws and [place] laws. We do not discriminate on the basis of race, color, national origin, age, disability sex, sexual orientation or gender identity.            Thank you!     Thank you for choosing [facility]  for your care. Our goal is always to provide you with excellent care. Hearing back from our patients is one way we can continue to improve our services. Please take a few minutes to complete the written survey that you may receive in the mail after your visit with us. Thank you!             Your Updated Medication List - Protect others around you: Learn how to safely use, store and throw away your medicines at www.disposemymeds.org.          This list is accurate as of: 9/5/17  9:13 AM.  Always use your most recent med list.                   Brand Name Dispense Instructions for use Diagnosis    cetirizine 10 MG tablet    zyrTEC     Take 10 mg by mouth daily        prenatal multivitamin plus iron 27-0.8 MG Tabs per tablet      Take 1  tablet by mouth daily        TYLENOL PO           VITAMIN D (CHOLECALCIFEROL) PO      Take 1,000 Units by mouth daily

## 2017-09-05 NOTE — PROGRESS NOTES
Doing well.   No complaints.   Denies VB, ctx, LOF. +FM  /73 (BP Location: Right arm, Patient Position: Chair, Cuff Size: Adult Regular)  Pulse 78  Wt 209 lb (94.8 kg)  LMP 2017  Breastfeeding? No  BMI 31.32 kg/m2  General Appearance: NAD  Abdomen: Gravid, NT  Refer to flow sheet above.   A/P: 27 year old  at 30w1d  -- reviewed normal 3 hr GTT, normal OB Hgb  -- PTL precautions reviewed  RTC in 2 weeks    Arianna Peng MD  [facility]

## 2017-09-18 ENCOUNTER — PRENATAL OFFICE VISIT (OUTPATIENT)
Dept: OBGYN | Facility: CLINIC | Age: 27
End: 2017-09-18
Payer: COMMERCIAL

## 2017-09-18 VITALS
SYSTOLIC BLOOD PRESSURE: 120 MMHG | DIASTOLIC BLOOD PRESSURE: 78 MMHG | WEIGHT: 210 LBS | BODY MASS INDEX: 31.1 KG/M2 | HEART RATE: 93 BPM | HEIGHT: 69 IN

## 2017-09-18 DIAGNOSIS — Z34.83 PRENATAL CARE, SUBSEQUENT PREGNANCY, THIRD TRIMESTER: Primary | ICD-10-CM

## 2017-09-18 PROCEDURE — 99207 ZZC PRENATAL VISIT: CPT | Performed by: OBSTETRICS & GYNECOLOGY

## 2017-09-18 NOTE — PROGRESS NOTES
"CC: Here for routine prenatal visit @ 32w0d   HPI:  Doing well; denies swelling or contractions; Encouraged patient to review contents of Prenatal Breastfeeding Education Toolkit. Offered opportunity to answer questions regarding the importance of skin to skin contact, early initiation of exclusive breastfeeding for the first six months and rooming in while in the hospital.  Will get flu shot at work    PE: /83 (BP Location: Right arm, Patient Position: Chair, Cuff Size: Adult Large)  Pulse 93  Ht 5' 8.5\" (1.74 m)  Wt 210 lb (95.3 kg)  LMP 02/06/2017  BMI 31.47 kg/m2   See OB flowsheet      A:  1. Prenatal care, subsequent pregnancy, third trimester        Routine prenatal care  RTC 2 weeks.      Juliette Davenport M.D.     "

## 2017-09-18 NOTE — MR AVS SNAPSHOT
After Visit Summary   9/18/2017    Jocelyn Causey    MRN: [id number]           Patient Information     Date Of Birth          1990        Visit Information        Provider Department      9/18/2017 9:00 AM Juliette Davenport MD [facility]        Today's Diagnoses     Prenatal care, subsequent pregnancy, third trimester    -  1       Follow-ups after your visit        Your next 10 appointments already scheduled     Oct 02, 2017  4:00 PM CDT   ESTABLISHED PRENATAL with Juliette Davenport MD   [facility]    [address] [address]   [phone]            Oct 16, 2017  4:00 PM CDT   ESTABLISHED PRENATAL with Juliette Davenport MD   [facility] ([facility])    [address] [address]   [phone]              Who to contact     If you have questions or need follow up information about today's clinic visit or your schedule please contact [facility] directly at [phone].  Normal or non-critical lab and imaging results will be communicated to you by [employer], letter or phone within 4 business days after the clinic has received the results. If you do not hear from us within 7 days, please contact the clinic through [employer] or phone. If you have a critical or abnormal lab result, we will notify you by phone as soon as possible.  Submit refill requests through [employer] or call your pharmacy and they will forward the refill request to us. Please allow 3 business days for your refill to be completed.          Additional Information About Your Visit        [employer] Information     [employer] gives you secure access to your electronic health record. If you see a primary care provider, you can also send messages to your care team and make appointments. If you have questions, please call your primary care clinic.  If you do not have a primary care  "provider, please call [phone] and they will assist you.        Care EveryWhere ID     This is your Care EveryWhere ID. This could be used by other organizations to access your [place] medical records  [id number]        Your Vitals Were     Pulse Height Last Period BMI (Body Mass Index)          93 5' 8.5\" (1.74 m) 02/06/2017 31.47 kg/m2         Blood Pressure from Last 3 Encounters:   09/18/17 120/78   09/05/17 115/73   08/21/17 132/74    Weight from Last 3 Encounters:   09/18/17 210 lb (95.3 kg)   09/05/17 209 lb (94.8 kg)   08/21/17 204 lb 6.4 oz (92.7 kg)              Today, you had the following     No orders found for display       Primary Care Provider Office Phone # Fax #    Mary Odilia York [phone]       [facility] [address] [address]        Equal Access to Services     [facility]: Hadii lavon tinoe hadasho Soomaali, waaxda luqadaha, qaybta kaalmada adeegyada, waxay gene francois . So [facility] [phone].    ATENCIÓN: Si habla español, tiene a dotson disposición servicios gratuitos de asistencia lingüística. [facility] [phone].    We comply with applicable federal civil rights laws and [place] laws. We do not discriminate on the basis of race, color, national origin, age, disability sex, sexual orientation or gender identity.            Thank you!     Thank you for choosing [facility]  for your care. Our goal is always to provide you with excellent care. Hearing back from our patients is one way we can continue to improve our services. Please take a few minutes to complete the written survey that you may receive in the mail after your visit with us. Thank you!             Your Updated Medication List - Protect others around you: Learn how to safely use, store and throw away your medicines at www.disposemymeds.org.          This list is accurate as of: 9/18/17  9:11 AM.  Always use your most recent med list.          "          Brand Name Dispense Instructions for use Diagnosis    cetirizine 10 MG tablet    zyrTEC     Take 10 mg by mouth daily        prenatal multivitamin plus iron 27-0.8 MG Tabs per tablet      Take 1 tablet by mouth daily        TYLENOL PO           VITAMIN D (CHOLECALCIFEROL) PO      Take 1,000 Units by mouth daily

## 2017-09-18 NOTE — NURSING NOTE
"Initial /83 (BP Location: Right arm, Patient Position: Chair, Cuff Size: Adult Large)  Pulse 93  Ht 5' 8.5\" (1.74 m)  Wt 210 lb (95.3 kg)  LMP 02/06/2017  BMI 31.47 kg/m2 Estimated body mass index is 31.47 kg/(m^2) as calculated from the following:    Height as of this encounter: 5' 8.5\" (1.74 m).    Weight as of this encounter: 210 lb (95.3 kg). .      "

## 2017-10-02 ENCOUNTER — PRENATAL OFFICE VISIT (OUTPATIENT)
Dept: OBGYN | Facility: CLINIC | Age: 27
End: 2017-10-02
Payer: COMMERCIAL

## 2017-10-02 VITALS
WEIGHT: 216 LBS | SYSTOLIC BLOOD PRESSURE: 110 MMHG | DIASTOLIC BLOOD PRESSURE: 76 MMHG | HEIGHT: 69 IN | HEART RATE: 101 BPM | BODY MASS INDEX: 31.99 KG/M2

## 2017-10-02 DIAGNOSIS — Z34.83 PRENATAL CARE, SUBSEQUENT PREGNANCY IN THIRD TRIMESTER: Primary | ICD-10-CM

## 2017-10-02 PROCEDURE — 99207 ZZC PRENATAL VISIT: CPT | Performed by: OBSTETRICS & GYNECOLOGY

## 2017-10-02 NOTE — MR AVS SNAPSHOT
After Visit Summary   10/2/2017    Jocelyn Causey    MRN: [id number]           Patient Information     Date Of Birth          1990        Visit Information        Provider Department      10/2/2017 4:00 PM Juliette Davenport MD [facility]        Today's Diagnoses     Prenatal care, subsequent pregnancy in third trimester    -  1       Follow-ups after your visit        Your next 10 appointments already scheduled     Oct 16, 2017  4:00 PM CDT   ESTABLISHED PRENATAL with Juliette Davenport MD   [facility]    [address] [address]   [phone]              Who to contact     If you have questions or need follow up information about today's clinic visit or your schedule please contact [facility] directly at [phone].  Normal or non-critical lab and imaging results will be communicated to you by MyChart, letter or phone within 4 business days after the clinic has received the results. If you do not hear from us within 7 days, please contact the clinic through MyChart or phone. If you have a critical or abnormal lab result, we will notify you by phone as soon as possible.  Submit refill requests through [employer] or call your pharmacy and they will forward the refill request to us. Please allow 3 business days for your refill to be completed.          Additional Information About Your Visit        MyChart Information     [employer] gives you secure access to your electronic health record. If you see a primary care provider, you can also send messages to your care team and make appointments. If you have questions, please call your primary care clinic.  If you do not have a primary care provider, please call [phone] and they will assist you.        Care EveryWhere ID     This is your Care EveryWhere ID. This could be used by other organizations to access your [facility]  "records  [id number]        Your Vitals Were     Pulse Height Last Period BMI (Body Mass Index)          101 5' 8.5\" (1.74 m) 02/06/2017 32.36 kg/m2         Blood Pressure from Last 3 Encounters:   10/02/17 110/76   09/18/17 120/78   09/05/17 115/73    Weight from Last 3 Encounters:   10/02/17 216 lb (98 kg)   09/18/17 210 lb (95.3 kg)   09/05/17 209 lb (94.8 kg)              Today, you had the following     No orders found for display       Primary Care Provider Office Phone # Fax #    Mary Odilia York [phone]       [facility] [address] [address]        Equal Access to Services     [facility] : Lizz Valdovinos, wajuan mane, qaybta kaalmaorin meyer, marta farncois . So [facility] [phone].    ATENCIÓN: Si habla español, tiene a dotson disposición servicios gratuitos de asistencia lingüística. Evgeny al [phone].    We comply with applicable federal civil rights laws and [place] laws. We do not discriminate on the basis of race, color, national origin, age, disability, sex, sexual orientation, or gender identity.            Thank you!     Thank you for choosing [facility]  for your care. Our goal is always to provide you with excellent care. Hearing back from our patients is one way we can continue to improve our services. Please take a few minutes to complete the written survey that you may receive in the mail after your visit with us. Thank you!             Your Updated Medication List - Protect others around you: Learn how to safely use, store and throw away your medicines at www.disposemymeds.org.          This list is accurate as of: 10/2/17  4:05 PM.  Always use your most recent med list.                   Brand Name Dispense Instructions for use Diagnosis    cetirizine 10 MG tablet    zyrTEC     Take 10 mg by mouth daily        prenatal multivitamin plus iron 27-0.8 MG Tabs per tablet      Take 1 " tablet by mouth daily        TYLENOL PO           VITAMIN D (CHOLECALCIFEROL) PO      Take 1,000 Units by mouth daily

## 2017-10-02 NOTE — PROGRESS NOTES
"CC: Here for routine prenatal visit @ 34w0d   HPI:  Will get flu shot at work; having some BHC; reviewed s/s of labor    PE: /76 (BP Location: Right arm, Patient Position: Chair, Cuff Size: Adult Large)  Pulse 101  Ht 5' 8.5\" (1.74 m)  Wt 216 lb (98 kg)  LMP 02/06/2017  BMI 32.36 kg/m2   See OB flowsheet      A:  1. Prenatal care, subsequent pregnancy in third trimester        Routine prenatal care  RTC 2 weeks.      Juliette Davenport M.D.     "

## 2017-10-02 NOTE — NURSING NOTE
"Initial /76 (BP Location: Right arm, Patient Position: Chair, Cuff Size: Adult Large)  Pulse 101  Ht 5' 8.5\" (1.74 m)  Wt 216 lb (98 kg)  LMP 02/06/2017  BMI 32.36 kg/m2 Estimated body mass index is 32.36 kg/(m^2) as calculated from the following:    Height as of this encounter: 5' 8.5\" (1.74 m).    Weight as of this encounter: 216 lb (98 kg). .      "

## 2017-10-16 ENCOUNTER — PRENATAL OFFICE VISIT (OUTPATIENT)
Dept: OBGYN | Facility: CLINIC | Age: 27
End: 2017-10-16
Payer: COMMERCIAL

## 2017-10-16 VITALS
WEIGHT: 221 LBS | BODY MASS INDEX: 32.73 KG/M2 | HEART RATE: 94 BPM | DIASTOLIC BLOOD PRESSURE: 83 MMHG | HEIGHT: 69 IN | SYSTOLIC BLOOD PRESSURE: 121 MMHG

## 2017-10-16 DIAGNOSIS — Z3A.36 36 WEEKS GESTATION OF PREGNANCY: Primary | ICD-10-CM

## 2017-10-16 DIAGNOSIS — Z23 NEED FOR PROPHYLACTIC VACCINATION AND INOCULATION AGAINST INFLUENZA: ICD-10-CM

## 2017-10-16 PROCEDURE — 90686 IIV4 VACC NO PRSV 0.5 ML IM: CPT | Performed by: OBSTETRICS & GYNECOLOGY

## 2017-10-16 PROCEDURE — 87653 STREP B DNA AMP PROBE: CPT | Performed by: OBSTETRICS & GYNECOLOGY

## 2017-10-16 PROCEDURE — 99207 ZZC PRENATAL VISIT: CPT | Performed by: OBSTETRICS & GYNECOLOGY

## 2017-10-16 PROCEDURE — 90471 IMMUNIZATION ADMIN: CPT | Performed by: OBSTETRICS & GYNECOLOGY

## 2017-10-16 NOTE — MR AVS SNAPSHOT
After Visit Summary   10/16/2017    Jocelyn Causey    MRN: [id number]           Patient Information     Date Of Birth          1990        Visit Information        Provider Department      10/16/2017 4:00 PM Juliette Davenport MD [facility]        Today's Diagnoses     36 weeks gestation of pregnancy    -  1    Need for prophylactic vaccination and inoculation against influenza           Follow-ups after your visit        Your next 10 appointments already scheduled     Oct 24, 2017  9:15 AM CDT   ESTABLISHED PRENATAL with Arianna Peng MD   [facility]    [address] [id number]   [phone]            Oct 31, 2017  4:00 PM CDT   ESTABLISHED PRENATAL with Arianna Peng MD   [facility]    [address] [id number]   [phone]            Nov 06, 2017  4:00 PM CST   ESTABLISHED PRENATAL with Juliette Davenport MD   [facility] ([facility])    [address] [id number]   [phone]            Nov 13, 2017 10:15 AM CST   ESTABLISHED PRENATAL with Juliette Davenport MD   [facility] ([facility])    [address] [phone]   [phone]              Who to contact     If you have questions or need follow up information about today's clinic visit or your schedule please contact [facility] directly at [phone].  Normal or non-critical lab and imaging results will be communicated to you by MyChart, letter or phone within 4 business days after the clinic has received the results. If you do not hear from us within 7 days, please contact the clinic through MyChart or phone. If you have a critical or abnormal lab result, we will notify you by phone as soon as possible.  Submit refill requests through [employer]  "or call your pharmacy and they will forward the refill request to us. Please allow 3 business days for your refill to be completed.          Additional Information About Your Visit        MyChart Information     [employer] gives you secure access to your electronic health record. If you see a primary care provider, you can also send messages to your care team and make appointments. If you have questions, please call your primary care clinic.  If you do not have a primary care provider, please call [phone] and they will assist you.        Care EveryWhere ID     This is your Care EveryWhere ID. This could be used by other organizations to access your [place] medical records  [id number]        Your Vitals Were     Pulse Height Last Period BMI (Body Mass Index)          94 5' 8.5\" (1.74 m) 02/06/2017 33.11 kg/m2         Blood Pressure from Last 3 Encounters:   10/16/17 121/83   10/02/17 110/76   09/18/17 120/78    Weight from Last 3 Encounters:   10/16/17 221 lb (100.2 kg)   10/02/17 216 lb (98 kg)   09/18/17 210 lb (95.3 kg)              We Performed the Following     FLU VAC, SPLIT VIRUS IM > 3 YO (QUADRIVALENT) [70545]     Strep, Group B by PCR     Vaccine Administration, Initial [53516]        Primary Care Provider Office Phone # Fax #    Mary Odilia York [phone]       [address] [address]        Equal Access to Services     [facility] : Hadii lavon Valdovinos, waaxda lusamantha, qaybta kaalmamaynor dillonay gene francois . So [facility] [phone].    ATENCIÓN: Si habla finn, tiene a dotson disposición servicios gratuitos de asistencia lingüística. Evgeny al [phone].    We comply with applicable federal civil rights laws and [place] laws. We do not discriminate on the basis of race, color, national origin, age, disability, sex, sexual orientation, or gender identity.            Thank you!     Thank you for choosing " [facility]  for your care. Our goal is always to provide you with excellent care. Hearing back from our patients is one way we can continue to improve our services. Please take a few minutes to complete the written survey that you may receive in the mail after your visit with us. Thank you!             Your Updated Medication List - Protect others around you: Learn how to safely use, store and throw away your medicines at www.disposemymeds.org.          This list is accurate as of: 10/16/17  4:12 PM.  Always use your most recent med list.                   Brand Name Dispense Instructions for use Diagnosis    cetirizine 10 MG tablet    zyrTEC     Take 10 mg by mouth daily        prenatal multivitamin plus iron 27-0.8 MG Tabs per tablet      Take 1 tablet by mouth daily        TYLENOL PO           VITAMIN D (CHOLECALCIFEROL) PO      Take 1,000 Units by mouth daily

## 2017-10-16 NOTE — NURSING NOTE
"Initial /83 (BP Location: Right arm, Patient Position: Chair, Cuff Size: Adult Large)  Pulse 94  Ht 5' 8.5\" (1.74 m)  Wt 221 lb (100.2 kg)  LMP 02/06/2017  BMI 33.11 kg/m2 Estimated body mass index is 33.11 kg/(m^2) as calculated from the following:    Height as of this encounter: 5' 8.5\" (1.74 m).    Weight as of this encounter: 221 lb (100.2 kg). .      "

## 2017-10-16 NOTE — PROGRESS NOTES
"CC: Here for routine prenatal visit @ 36w0d   HPI:  More contractions and pressure; denies ROM    PE: /83 (BP Location: Right arm, Patient Position: Chair, Cuff Size: Adult Large)  Pulse 94  Ht 5' 8.5\" (1.74 m)  Wt 221 lb (100.2 kg)  LMP 02/06/2017  BMI 33.11 kg/m2   See OB flowsheet      A:  1. 36 weeks gestation of pregnancy  Labor precautions reviewed; GBS taken  - Strep, Group B by PCR    2. Need for prophylactic vaccination and inoculation against influenza    - FLU VAC, SPLIT VIRUS IM > 3 YO (QUADRIVALENT) [12009]  - Vaccine Administration, Initial [48131]      Routine prenatal care  RTC 1 weeks.      Juliette Davenport M.D.     "

## 2017-10-16 NOTE — PROGRESS NOTES
Injectable Influenza Immunization Documentation    1.  Is the person to be vaccinated sick today?   No    2. Does the person to be vaccinated have an allergy to a component   of the vaccine?   No    3. Has the person to be vaccinated ever had a serious reaction   to influenza vaccine in the past?   No    4. Has the person to be vaccinated ever had Guillain-Barré syndrome?   No    Form completed by Barb Armenta

## 2017-10-17 LAB
GP B STREP DNA SPEC QL NAA+PROBE: NEGATIVE
SPECIMEN SOURCE: NORMAL

## 2017-10-24 ENCOUNTER — PRENATAL OFFICE VISIT (OUTPATIENT)
Dept: OBGYN | Facility: CLINIC | Age: 27
End: 2017-10-24
Payer: COMMERCIAL

## 2017-10-24 VITALS
WEIGHT: 225.2 LBS | HEART RATE: 118 BPM | BODY MASS INDEX: 33.36 KG/M2 | DIASTOLIC BLOOD PRESSURE: 99 MMHG | HEIGHT: 69 IN | SYSTOLIC BLOOD PRESSURE: 137 MMHG

## 2017-10-24 DIAGNOSIS — Z34.83 PRENATAL CARE, SUBSEQUENT PREGNANCY IN THIRD TRIMESTER: Primary | ICD-10-CM

## 2017-10-24 LAB
ALT SERPL W P-5'-P-CCNC: 18 U/L (ref 0–50)
AST SERPL W P-5'-P-CCNC: 15 U/L (ref 0–45)
CREAT SERPL-MCNC: 0.59 MG/DL (ref 0.52–1.04)
CREAT UR-MCNC: 27 MG/DL
ERYTHROCYTE [DISTWIDTH] IN BLOOD BY AUTOMATED COUNT: 13.8 % (ref 10–15)
GFR SERPL CREATININE-BSD FRML MDRD: >90 ML/MIN/1.7M2
HCT VFR BLD AUTO: 37.9 % (ref 35–47)
HGB BLD-MCNC: 12.9 G/DL (ref 11.7–15.7)
MCH RBC QN AUTO: 31.5 PG (ref 26.5–33)
MCHC RBC AUTO-ENTMCNC: 34 G/DL (ref 31.5–36.5)
MCV RBC AUTO: 92 FL (ref 78–100)
PLATELET # BLD AUTO: 252 10E9/L (ref 150–450)
PROT UR-MCNC: <0.05 G/L
PROT/CREAT 24H UR: NORMAL G/G CR (ref 0–0.2)
RBC # BLD AUTO: 4.1 10E12/L (ref 3.8–5.2)
URATE SERPL-MCNC: 4.1 MG/DL (ref 2.6–6)
WBC # BLD AUTO: 13.4 10E9/L (ref 4–11)

## 2017-10-24 PROCEDURE — 85027 COMPLETE CBC AUTOMATED: CPT | Performed by: OBSTETRICS & GYNECOLOGY

## 2017-10-24 PROCEDURE — 84550 ASSAY OF BLOOD/URIC ACID: CPT | Performed by: OBSTETRICS & GYNECOLOGY

## 2017-10-24 PROCEDURE — 99207 ZZC PRENATAL VISIT: CPT | Performed by: OBSTETRICS & GYNECOLOGY

## 2017-10-24 PROCEDURE — 84156 ASSAY OF PROTEIN URINE: CPT | Performed by: OBSTETRICS & GYNECOLOGY

## 2017-10-24 PROCEDURE — 84450 TRANSFERASE (AST) (SGOT): CPT | Performed by: OBSTETRICS & GYNECOLOGY

## 2017-10-24 PROCEDURE — 36415 COLL VENOUS BLD VENIPUNCTURE: CPT | Performed by: OBSTETRICS & GYNECOLOGY

## 2017-10-24 PROCEDURE — 82565 ASSAY OF CREATININE: CPT | Performed by: OBSTETRICS & GYNECOLOGY

## 2017-10-24 PROCEDURE — 84460 ALANINE AMINO (ALT) (SGPT): CPT | Performed by: OBSTETRICS & GYNECOLOGY

## 2017-10-24 NOTE — MR AVS SNAPSHOT
After Visit Summary   10/24/2017    Jocelyn Causey    MRN: [id number]           Patient Information     Date Of Birth          1990        Visit Information        Provider Department      10/24/2017 9:15 AM Arianna Peng MD [facility]        Today's Diagnoses     Prenatal care, subsequent pregnancy in third trimester    -  1       Follow-ups after your visit        Your next 10 appointments already scheduled     Oct 31, 2017  4:00 PM CDT   ESTABLISHED PRENATAL with Arianna Peng MD   [facility] ([facility])    [address] [address]   [phone]            Nov 06, 2017  4:00 PM CST   ESTABLISHED PRENATAL with Juliette Davenport MD   [facility] ([facility])    [address] [address]   [phone]            Nov 13, 2017 10:15 AM CST   ESTABLISHED PRENATAL with Juliette Davenport MD   [facility] ([facility])    [address] [address]   [phone]              Who to contact     If you have questions or need follow up information about today's clinic visit or your schedule please contact [facility] directly at [phone].  Normal or non-critical lab and imaging results will be communicated to you by MyChart, letter or phone within 4 business days after the clinic has received the results. If you do not hear from us within 7 days, please contact the clinic through [employer] or phone. If you have a critical or abnormal lab result, we will notify you by phone as soon as possible.  Submit refill requests through [employer] or call your pharmacy and they will forward the refill request to us. Please allow 3 business days for your refill to be completed.          Additional Information About Your Visit        [employer] Information     [employer] gives you secure access to your electronic  "health record. If you see a primary care provider, you can also send messages to your care team and make appointments. If you have questions, please call your primary care clinic.  If you do not have a primary care provider, please call [phone] and they will assist you.        Care EveryWhere ID     This is your Care EveryWhere ID. This could be used by other organizations to access your [place] medical records  [id number]        Your Vitals Were     Pulse Height Last Period BMI (Body Mass Index)          118 5' 8.5\" (1.74 m) 02/06/2017 33.74 kg/m2         Blood Pressure from Last 3 Encounters:   10/24/17 (!) 137/99   10/16/17 121/83   10/02/17 110/76    Weight from Last 3 Encounters:   10/24/17 225 lb 3.2 oz (102.2 kg)   10/16/17 221 lb (100.2 kg)   10/02/17 216 lb (98 kg)              We Performed the Following     ALT     AST     CBC with platelets     Creatinine     Protein  random urine with Creat Ratio     Uric acid        Primary Care Provider Office Phone # Fax #    Mary Odilia York [phone]       [facility] [address] [address]        Equal Access to Services     MITZY BROWN : Hadii lavon chestero Aruna, waaxda luqadaha, qaybta kaalmada adeegyada, marta borges. So [facility] [phone].    ATENCIÓN: Si habla español, tiene a dotson disposición servicios gratuitos de asistencia lingüística. Llame al [phone].    We comply with applicable federal civil rights laws and [place] laws. We do not discriminate on the basis of race, color, national origin, age, disability, sex, sexual orientation, or gender identity.            Thank you!     Thank you for choosing [facility]  for your care. Our goal is always to provide you with excellent care. Hearing back from our patients is one way we can continue to improve our services. Please take a few minutes to complete the written survey that you may receive in the " mail after your visit with us. Thank you!             Your Updated Medication List - Protect others around you: Learn how to safely use, store and throw away your medicines at www.disposemymeds.org.          This list is accurate as of: 10/24/17  9:35 AM.  Always use your most recent med list.                   Brand Name Dispense Instructions for use Diagnosis    cetirizine 10 MG tablet    zyrTEC     Take 10 mg by mouth daily        prenatal multivitamin plus iron 27-0.8 MG Tabs per tablet      Take 1 tablet by mouth daily        TYLENOL PO           VITAMIN D (CHOLECALCIFEROL) PO      Take 1,000 Units by mouth daily

## 2017-10-24 NOTE — NURSING NOTE
"Chief Complaint   Patient presents with     Prenatal Care     headaches, swollen, and contraction feeling       Initial BP (!) 128/95 (BP Location: Left arm, Patient Position: Chair, Cuff Size: Adult Large)  Pulse 124  Ht 5' 8.5\" (1.74 m)  Wt 225 lb 3.2 oz (102.2 kg)  LMP 02/06/2017  BMI 33.74 kg/m2 Estimated body mass index is 33.74 kg/(m^2) as calculated from the following:    Height as of this encounter: 5' 8.5\" (1.74 m).    Weight as of this encounter: 225 lb 3.2 oz (102.2 kg).  Medication Reconciliation: complete     Luciana Francis CMA      "

## 2017-10-24 NOTE — PROGRESS NOTES
"Doing well.   Reports temporal headache and oconnor in vision following a sneezing episode. Denies RUQ pain.   Denies VB, ctx, LOF. +FM  BP (!) 128/95 (BP Location: Left arm, Patient Position: Chair, Cuff Size: Adult Large)  Pulse 124  Ht 5' 8.5\" (1.74 m)  Wt 225 lb 3.2 oz (102.2 kg)  LMP 2017  BMI 33.74 kg/m2   BP (!) 137/99 (BP Location: Left arm, Patient Position: Chair, Cuff Size: Adult Large)  Pulse 118  Ht 5' 8.5\" (1.74 m)  Wt 225 lb 3.2 oz (102.2 kg)  LMP 2017  BMI 33.74 kg/m2  General Appearance: NAD  Abdomen: Gravid, NT  Refer to flow sheet above.   A/P: 27 year old  at 37w1d  -- elevated BPs: will order HELLP labs; pre-eclampsia precautions reviewed  -- reviewed GBS negative status  -- Discussed with Jocelyn Causey, the following; indications; the agents and methods of labor augmentation, including risks, benefits, and alternative approaches; and the possible need for  birth. EFW is AGA. The Labor Induction:what you need to know information sheet was made available to her. Questions and concerns were addressed and patient agrees to above if necessary during the course of her labor.  -- labor precautions reviewed    Arianna Peng MD  [facility]            "

## 2017-10-26 NOTE — PROGRESS NOTES
HELLP labs are normal.   Will review at next follow-up visit.     Arianna Peng MD  [facility]

## 2017-10-31 ENCOUNTER — PRENATAL OFFICE VISIT (OUTPATIENT)
Dept: OBGYN | Facility: CLINIC | Age: 27
End: 2017-10-31
Payer: COMMERCIAL

## 2017-10-31 ENCOUNTER — HOSPITAL ENCOUNTER (OUTPATIENT)
Facility: CLINIC | Age: 27
Discharge: HOME | End: 2017-10-31
Attending: OBSTETRICS & GYNECOLOGY | Admitting: OBSTETRICS & GYNECOLOGY
Payer: COMMERCIAL

## 2017-10-31 ENCOUNTER — APPOINTMENT (OUTPATIENT)
Dept: ULTRASOUND IMAGING | Facility: CLINIC | Age: 27
End: 2017-10-31
Attending: OBSTETRICS & GYNECOLOGY
Payer: COMMERCIAL

## 2017-10-31 VITALS
RESPIRATION RATE: 16 BRPM | TEMPERATURE: 98 F | DIASTOLIC BLOOD PRESSURE: 92 MMHG | HEART RATE: 100 BPM | SYSTOLIC BLOOD PRESSURE: 139 MMHG

## 2017-10-31 VITALS
BODY MASS INDEX: 34.16 KG/M2 | DIASTOLIC BLOOD PRESSURE: 97 MMHG | SYSTOLIC BLOOD PRESSURE: 147 MMHG | WEIGHT: 228 LBS | HEART RATE: 101 BPM

## 2017-10-31 DIAGNOSIS — Z34.83 PRENATAL CARE, SUBSEQUENT PREGNANCY IN THIRD TRIMESTER: Primary | ICD-10-CM

## 2017-10-31 PROBLEM — O13.9 GESTATIONAL HYPERTENSION: Status: ACTIVE | Noted: 2017-10-31

## 2017-10-31 LAB
ALT SERPL W P-5'-P-CCNC: 18 U/L (ref 0–50)
AST SERPL W P-5'-P-CCNC: 14 U/L (ref 0–45)
CREAT SERPL-MCNC: 0.53 MG/DL (ref 0.52–1.04)
CREAT UR-MCNC: 21 MG/DL
ERYTHROCYTE [DISTWIDTH] IN BLOOD BY AUTOMATED COUNT: 13.6 % (ref 10–15)
GFR SERPL CREATININE-BSD FRML MDRD: >90 ML/MIN/1.7M2
HCT VFR BLD AUTO: 36.3 % (ref 35–47)
HGB BLD-MCNC: 12.3 G/DL (ref 11.7–15.7)
MCH RBC QN AUTO: 30.5 PG (ref 26.5–33)
MCHC RBC AUTO-ENTMCNC: 33.9 G/DL (ref 31.5–36.5)
MCV RBC AUTO: 90 FL (ref 78–100)
PLATELET # BLD AUTO: 262 10E9/L (ref 150–450)
PROT UR-MCNC: <0.05 G/L
PROT/CREAT 24H UR: NORMAL G/G CR (ref 0–0.2)
RBC # BLD AUTO: 4.03 10E12/L (ref 3.8–5.2)
WBC # BLD AUTO: 13.4 10E9/L (ref 4–11)

## 2017-10-31 PROCEDURE — 99207 ZZC PRENATAL VISIT: CPT | Performed by: OBSTETRICS & GYNECOLOGY

## 2017-10-31 PROCEDURE — 84450 TRANSFERASE (AST) (SGOT): CPT | Performed by: OBSTETRICS & GYNECOLOGY

## 2017-10-31 PROCEDURE — 82565 ASSAY OF CREATININE: CPT | Performed by: OBSTETRICS & GYNECOLOGY

## 2017-10-31 PROCEDURE — 84460 ALANINE AMINO (ALT) (SGPT): CPT | Performed by: OBSTETRICS & GYNECOLOGY

## 2017-10-31 PROCEDURE — 76819 FETAL BIOPHYS PROFIL W/O NST: CPT

## 2017-10-31 PROCEDURE — 59025 FETAL NON-STRESS TEST: CPT | Mod: 26 | Performed by: OBSTETRICS & GYNECOLOGY

## 2017-10-31 PROCEDURE — 85027 COMPLETE CBC AUTOMATED: CPT | Performed by: OBSTETRICS & GYNECOLOGY

## 2017-10-31 PROCEDURE — 99214 OFFICE O/P EST MOD 30 MIN: CPT | Mod: 25

## 2017-10-31 PROCEDURE — 84156 ASSAY OF PROTEIN URINE: CPT | Performed by: OBSTETRICS & GYNECOLOGY

## 2017-10-31 PROCEDURE — 36415 COLL VENOUS BLD VENIPUNCTURE: CPT | Performed by: OBSTETRICS & GYNECOLOGY

## 2017-10-31 PROCEDURE — 59025 FETAL NON-STRESS TEST: CPT

## 2017-10-31 RX ORDER — ONDANSETRON 2 MG/ML
4 INJECTION INTRAMUSCULAR; INTRAVENOUS EVERY 6 HOURS PRN
Status: DISCONTINUED | OUTPATIENT
Start: 2017-10-31 | End: 2017-10-31 | Stop reason: HOSPADM

## 2017-10-31 RX ORDER — ONDANSETRON 2 MG/ML
4 INJECTION INTRAMUSCULAR; INTRAVENOUS EVERY 6 HOURS PRN
Status: CANCELLED | OUTPATIENT
Start: 2017-10-31

## 2017-10-31 NOTE — PROGRESS NOTES
Doing well.   Reports fullness in her head and sinus region; still have temporal headaches that come and go but resolve with Tylenol PO. Only had one episode of oconnor in vision since the last visit that was associated with sneezing.   Otherwise, reports irregular mild ctxs.   Denies VB,  LOF. +FM  Denies RUQ pain.   BP (!) 147/97 (BP Location: Left arm, Patient Position: Chair, Cuff Size: Adult Large)  Pulse 101  Wt 228 lb (103.4 kg)  LMP 2017  Breastfeeding? No  BMI 34.16 kg/m2  General Appearance: NAD  Abdomen: Gravid, NT  Refer to flow sheet above.   A/P: 27 year old  at 38w1d  -- reviewed normal HELLP labs from last visit  -- gHTN: meets criteria based on elevated BPs on two separate occasions; will send to birth center for further management and possible induction of labor today; Dr. Patten, on call physician has been notified of patient's arrival and work-up; recommend delivery today if serial blood pressure readings are persistently elevated, abnormal BPP and/or non-reactive NST; if normal, then return tomorrow AM for delivery given inadequate staff coverage at [facility] for delivery    -- Discussed with Jocelyn Causey, the following; indications; the agents and methods of labor augmentation, including risks, benefits, and alternative approaches; and the possible need for  birth. EFW is AGA. The Labor Induction:what you need to know information sheet was made available to her. Questions and concerns were addressed and patient agrees to above if necessary during the course of her labor.  -- labor and pre-eclampsia precautions reviewed      Arianna Peng MD  [facility]

## 2017-10-31 NOTE — DISCHARGE INSTRUCTIONS
Discharge Instructions for Undelivered Patients  Birthplace Phone # [phone]     Diet:  * Drink 8 to 12 glasses of liquids (milk, juice, water) every day  * You may eat meals and snacks.    Activity:  * Rest the pelvic area. No sex. Do not stimulate breasts or nipples.  * Stay on bed rest or partial bed rest.  * Count fetal kicks every day (see handout).  * Call your doctor or nurse midwife if your baby is moving less than usual.    Call your provider if you notice:  * Swelling in your face or increased swelling in your hands or legs.  * Headaches that are not relieved by Tylenol (acetaminophen).  * Changes in your vision (blurring; seeing spots or stars).  * Nausea (sick to your stomach) and vomiting (throwing up).  * Weight gain of 5 pounds per week.  * Heartburn that doesn't go away.  * Signs of bladder infection: Pain when you urinate (use the toilet), needing to go more often or more urgently.  * The bag of bingham (membrane) breaks, or you notice leaking in your underwear.  * Bright red blood in your underwear.  * Abdominal (lower belly) or stomach pain.  * For first baby: Contractions (tightenings) less than 5 minutes apart for one hour or more.  * Second (plus) baby: Contractions (tightenings) less than 10 minutes apart and getting stronger.  * Increase or change in vaginal discharge (note the color and amount).    Call at 0630 11/1/17  Plan to return at 0730 for pitocin induction of labor

## 2017-10-31 NOTE — MR AVS SNAPSHOT
After Visit Summary   10/31/2017    Jocelyn Causey    MRN: [id number]           Patient Information     Date Of Birth          1990        Visit Information        Provider Department      10/31/2017 4:00 PM Arianna Peng MD [facility]        Today's Diagnoses     Prenatal care, subsequent pregnancy in third trimester    -  1       Follow-ups after your visit        Your next 10 appointments already scheduled     Nov 06, 2017  4:00 PM CST   ESTABLISHED PRENATAL with Juliette Davenport MD   [facility] ([facility]    [address] [address]   [phone]            Nov 13, 2017 10:15 AM CST   ESTABLISHED PRENATAL with Juliette Davenport MD   [facility] ([facility]    [address] [address]   [phone]              Who to contact     If you have questions or need follow up information about today's clinic visit or your schedule please contact [facility] directly at [phone].  Normal or non-critical lab and imaging results will be communicated to you by [email or web address], letter or phone within 4 business days after the clinic has received the results. If you do not hear from us within 7 days, please contact the clinic through [email or web address] or phone. If you have a critical or abnormal lab result, we will notify you by phone as soon as possible.  Submit refill requests through Whelse or call your pharmacy and they will forward the refill request to us. Please allow 3 business days for your refill to be completed.          Additional Information About Your Visit        [email or web address] Information     [employer]se gives you secure access to your electronic health record. If you see a primary care provider, you can also send messages to your care team and make appointments. If you have questions, please call your primary care clinic.  If you do not have a primary care  provider, please call [phone] and they will assist you.        Care EveryWhere ID     This is your Care EveryWhere ID. This could be used by other organizations to access your [place] medical records  [id number]        Your Vitals Were     Pulse Last Period Breastfeeding? BMI (Body Mass Index)          101 02/06/2017 No 34.16 kg/m2         Blood Pressure from Last 3 Encounters:   10/31/17 (!) 139/92   10/31/17 (!) 147/97   10/24/17 (!) 137/99    Weight from Last 3 Encounters:   10/31/17 228 lb (103.4 kg)   10/24/17 225 lb 3.2 oz (102.2 kg)   10/16/17 221 lb (100.2 kg)              Today, you had the following     No orders found for display       Primary Care Provider Office Phone # Fax #    Mary Odilia York [phone]       [facility] [address] [address]        Equal Access to Services     [facility] : Hadii aad ku hadasho Soomaali, waaxda luqadaha, qaybta kaalmada adeegyada, waxay gene francois . So [facility] [phone].    ATENCIÓN: Si habla español, tiene a dotson disposición servicios gratuitos de asistencia lingüística. Llame al [phone].    We comply with applicable federal civil rights laws and [place] laws. We do not discriminate on the basis of race, color, national origin, age, disability, sex, sexual orientation, or gender identity.            Thank you!     Thank you for choosing [facility]  for your care. Our goal is always to provide you with excellent care. Hearing back from our patients is one way we can continue to improve our services. Please take a few minutes to complete the written survey that you may receive in the mail after your visit with us. Thank you!             Your Updated Medication List - Protect others around you: Learn how to safely use, store and throw away your medicines at www.disposemymeds.org.          This list is accurate as of: 10/31/17  4:16 PM.  Always use your most recent med  list.                   Brand Name Dispense Instructions for use Diagnosis    cetirizine 10 MG tablet    zyrTEC     Take 10 mg by mouth daily        prenatal multivitamin plus iron 27-0.8 MG Tabs per tablet      Take 1 tablet by mouth daily        TYLENOL PO           VITAMIN D (CHOLECALCIFEROL) PO      Take 1,000 Units by mouth daily

## 2017-10-31 NOTE — NURSING NOTE
"Initial BP (!) 147/97 (BP Location: Left arm, Patient Position: Chair, Cuff Size: Adult Large)  Pulse 101  Wt 228 lb (103.4 kg)  LMP 02/06/2017  Breastfeeding? No  BMI 34.16 kg/m2 Estimated body mass index is 34.16 kg/(m^2) as calculated from the following:    Height as of 10/24/17: 5' 8.5\" (1.74 m).    Weight as of this encounter: 228 lb (103.4 kg). .    Mishel Cooper    "

## 2017-10-31 NOTE — IP AVS SNAPSHOT
[facility]    [address] [address]    Phone:  [phone]    Fax:  [phone]                                       After Visit Summary   10/31/2017    Jocelyn Causey    MRN: [id number]           After Visit Summary Signature Page     I have received my discharge instructions, and my questions have been answered. I have discussed any challenges I see with this plan with the nurse or doctor.    ..........................................................................................................................................  Patient/Patient Representative Signature      ..........................................................................................................................................  Patient Representative Print Name and Relationship to Patient    ..................................................               ................................................  Date                                            Time    ..........................................................................................................................................  Reviewed by Signature/Title    ...................................................              ..............................................  Date                                                            Time

## 2017-10-31 NOTE — IP AVS SNAPSHOT
MRN:[id number]                      After Visit Summary   10/31/2017    Jocelyn Causey    MRN: [id number]           Thank you!     Thank you for choosing [facility] for your care. Our goal is always to provide you with excellent care. Hearing back from our patients is one way we can continue to improve our services. Please take a few minutes to complete the written survey that you may receive in the mail after you visit with us. Thank you!        Patient Information     Date Of Birth          1990        About your hospital stay     You were admitted on:  October 31, 2017 You last received care in the:  [facility]    You were discharged on:  October 31, 2017       Who to Call     For medical emergencies, please call 911.  For non-urgent questions about your medical care, please call your primary care provider or clinic, [phone]          Attending Provider     Provider Specialty    Arianna Peng MD OB/Gyn       Primary Care Provider Office Phone # Fax #    aMry Odilia York [phone]      Your next 10 appointments already scheduled     Nov 06, 2017  4:00 PM CST   ESTABLISHED PRENATAL with Juliette [facility]port [facility]   [facility]    [address] [address] [phone]            Nov 13, 2017 10:15 AM CST   ESTABLISHED PRENATAL with Juliette Davenport MD   [facility]    [address] [address] [phone]              Further instructions from your care team       Discharge Instructions for Undelivered Patients  [facility] Phone # [phone]     Diet:  * Drink 8 to 12 glasses of liquids (milk, juice, water) every day  * You may eat meals and snacks.    Activity:  * Rest the pelvic area. No sex. Do not stimulate breasts or nipples.  * Stay on bed rest or partial bed rest.  * Count fetal kicks every day  (see handout).  * Call your doctor or nurse midwife if your baby is moving less than usual.    Call your provider if you notice:  * Swelling in your face or increased swelling in your hands or legs.  * Headaches that are not relieved by Tylenol (acetaminophen).  * Changes in your vision (blurring; seeing spots or stars).  * Nausea (sick to your stomach) and vomiting (throwing up).  * Weight gain of 5 pounds per week.  * Heartburn that doesn't go away.  * Signs of bladder infection: Pain when you urinate (use the toilet), needing to go more often or more urgently.  * The bag of bingham (membrane) breaks, or you notice leaking in your underwear.  * Bright red blood in your underwear.  * Abdominal (lower belly) or stomach pain.  * For first baby: Contractions (tightenings) less than 5 minutes apart for one hour or more.  * Second (plus) baby: Contractions (tightenings) less than 10 minutes apart and getting stronger.  * Increase or change in vaginal discharge (note the color and amount).    Call at 0630 11/1/17  Plan to return at 0730 for pitocin induction of labor        Pending Results     Date and Time Order Name Status Description    10/31/2017 1624 US Fetal Biophys Prof w/o Non Stress Test Preliminary             Admission Information     Date & Time Provider Department Dept. Phone    10/31/2017 Arianna Peng MD [facility] [phone]      Your Vitals Were     Blood Pressure Pulse Temperature Respirations Last Period       139/92 100 98  F (36.7  C) (Oral) 16 02/06/2017       iPosihart Information     uberlife gives you secure access to your electronic health record. If you see a primary care provider, you can also send messages to your care team and make appointments. If you have questions, please call your primary care clinic.  If you do not have a primary care provider, please call [phone] and they will assist you.        Care EveryWhere ID     This is your Care EveryWhere ID. This  could be used by other organizations to access your [place] medical records  [id number]        Equal Access to Services     MITZY BROWN : Hadii lavon Valdovinos, wajuan mane, qaybta karobinda titabelleorin, marta rochemajackie borges. So [facility] [phone].    ATENCIÓN: Si habla español, tiene a dotson disposición servicios gratuitos de asistencia lingüística. Llame al [phone].    We comply with applicable federal civil rights laws and [place] laws. We do not discriminate on the basis of race, color, national origin, age, disability, sex, sexual orientation, or gender identity.               Review of your medicines      UNREVIEWED medicines. Ask your doctor about these medicines        Dose / Directions    cetirizine 10 MG tablet   Commonly known as:  zyrTEC        Dose:  10 mg   Take 10 mg by mouth daily   Refills:  0       prenatal multivitamin plus iron 27-0.8 MG Tabs per tablet   Indication:  Pregnancy        Dose:  1 tablet   Take 1 tablet by mouth daily   Refills:  0       TYLENOL PO        Refills:  0       VITAMIN D (CHOLECALCIFEROL) PO        Dose:  1000 Units   Take 1,000 Units by mouth daily   Refills:  0                Protect others around you: Learn how to safely use, store and throw away your medicines at www.disposemymeds.org.             Medication List: This is a list of all your medications and when to take them. Check marks below indicate your daily home schedule. Keep this list as a reference.      Medications           Morning Afternoon Evening Bedtime As Needed    cetirizine 10 MG tablet   Commonly known as:  zyrTEC   Take 10 mg by mouth daily                                prenatal multivitamin plus iron 27-0.8 MG Tabs per tablet   Take 1 tablet by mouth daily                                TYLENOL PO                                VITAMIN D (CHOLECALCIFEROL) PO   Take 1,000 Units by mouth daily                                          More Information        Kick  Counts  It s normal to worry about your baby s health. One way you can know your baby s doing well is to record the baby s movements once a day. This is called a kick count. You will usually feel your baby move by the 20th week of pregnancy. Remember to take your kick count records to all your appointments with your healthcare provider.       How to Count Kicks    Choose a time when the baby is active, such as after a meal.     Sit comfortably or lie on your side.     The first time the baby moves, write down the time.     Count each movement until the baby has moved 10 times. This can take from 20 minutes to 2 hours.     If the baby hasn t moved 4 times in 1 hour, pat your stomach to wake the baby up.    Write down the time you feel the baby s 10th movement.    Try to do it at the same time each day.  When to Call Your Healthcare Provider  Call your healthcare provider right away if you notice any of the following:    Your baby moves fewer than 10 times in 2 hours while you re doing kick counts.    Your baby moves much less often than on the days before.    You have not felt your baby move all day.    1331-2760 [employer]. [address] [place] [address], [place] [address]. All rights reserved. This information is not intended as a substitute for professional medical care. Always follow your healthcare professional's instructions.  This information has been modified by your health care provider with permission from the publisher.

## 2017-11-01 ENCOUNTER — HOSPITAL ENCOUNTER (INPATIENT)
Facility: CLINIC | Age: 27
LOS: 1 days | Discharge: HOME | End: 2017-11-02
Attending: OBSTETRICS & GYNECOLOGY | Admitting: OBSTETRICS & GYNECOLOGY
Payer: COMMERCIAL

## 2017-11-01 ENCOUNTER — ANESTHESIA (OUTPATIENT)
Dept: OBGYN | Facility: CLINIC | Age: 27
End: 2017-11-01
Payer: COMMERCIAL

## 2017-11-01 ENCOUNTER — ANESTHESIA EVENT (OUTPATIENT)
Dept: OBGYN | Facility: CLINIC | Age: 27
End: 2017-11-01
Payer: COMMERCIAL

## 2017-11-01 LAB
ABO + RH BLD: NORMAL
ABO + RH BLD: NORMAL
BASOPHILS # BLD AUTO: 0 10E9/L (ref 0–0.2)
BASOPHILS NFR BLD AUTO: 0.2 %
BLOOD BANK CMNT PATIENT-IMP: NORMAL
BLOOD BANK CMNT PATIENT-IMP: NORMAL
DIFFERENTIAL METHOD BLD: ABNORMAL
EOSINOPHIL # BLD AUTO: 0.1 10E9/L (ref 0–0.7)
EOSINOPHIL NFR BLD AUTO: 1.1 %
ERYTHROCYTE [DISTWIDTH] IN BLOOD BY AUTOMATED COUNT: 13.7 % (ref 10–15)
HCT VFR BLD AUTO: 36.3 % (ref 35–47)
HGB BLD-MCNC: 12.4 G/DL (ref 11.7–15.7)
IMM GRANULOCYTES # BLD: 0.1 10E9/L (ref 0–0.4)
IMM GRANULOCYTES NFR BLD: 1.2 %
LYMPHOCYTES # BLD AUTO: 1.6 10E9/L (ref 0.8–5.3)
LYMPHOCYTES NFR BLD AUTO: 13.5 %
MCH RBC QN AUTO: 30.8 PG (ref 26.5–33)
MCHC RBC AUTO-ENTMCNC: 34.2 G/DL (ref 31.5–36.5)
MCV RBC AUTO: 90 FL (ref 78–100)
MONOCYTES # BLD AUTO: 0.9 10E9/L (ref 0–1.3)
MONOCYTES NFR BLD AUTO: 7.8 %
NEUTROPHILS # BLD AUTO: 9.2 10E9/L (ref 1.6–8.3)
NEUTROPHILS NFR BLD AUTO: 76.2 %
PLATELET # BLD AUTO: 268 10E9/L (ref 150–450)
RBC # BLD AUTO: 4.03 10E12/L (ref 3.8–5.2)
SPECIMEN EXP DATE BLD: NORMAL
WBC # BLD AUTO: 12.1 10E9/L (ref 4–11)

## 2017-11-01 PROCEDURE — S0020 INJECTION, BUPIVICAINE HYDRO: HCPCS | Performed by: OBSTETRICS & GYNECOLOGY

## 2017-11-01 PROCEDURE — 25000128 H RX IP 250 OP 636: Performed by: OBSTETRICS & GYNECOLOGY

## 2017-11-01 PROCEDURE — 25000125 ZZHC RX 250: Performed by: OBSTETRICS & GYNECOLOGY

## 2017-11-01 PROCEDURE — 25000132 ZZH RX MED GY IP 250 OP 250 PS 637: Performed by: OBSTETRICS & GYNECOLOGY

## 2017-11-01 PROCEDURE — 72200001 ZZH LABOR CARE VAGINAL DELIVERY SINGLE

## 2017-11-01 PROCEDURE — 25000125 ZZHC RX 250: Performed by: NURSE ANESTHETIST, CERTIFIED REGISTERED

## 2017-11-01 PROCEDURE — 86780 TREPONEMA PALLIDUM: CPT | Performed by: OBSTETRICS & GYNECOLOGY

## 2017-11-01 PROCEDURE — 37000011 ZZH ANESTHESIA WARD SERVICE: Performed by: NURSE ANESTHETIST, CERTIFIED REGISTERED

## 2017-11-01 PROCEDURE — 25000128 H RX IP 250 OP 636: Performed by: NURSE ANESTHETIST, CERTIFIED REGISTERED

## 2017-11-01 PROCEDURE — 3E033VJ INTRODUCTION OF OTHER HORMONE INTO PERIPHERAL VEIN, PERCUTANEOUS APPROACH: ICD-10-PCS | Performed by: OBSTETRICS & GYNECOLOGY

## 2017-11-01 PROCEDURE — 88307 TISSUE EXAM BY PATHOLOGIST: CPT | Mod: 26 | Performed by: OBSTETRICS & GYNECOLOGY

## 2017-11-01 PROCEDURE — 40000671 ZZH STATISTIC ANESTHESIA CASE

## 2017-11-01 PROCEDURE — 59400 OBSTETRICAL CARE: CPT | Performed by: OBSTETRICS & GYNECOLOGY

## 2017-11-01 PROCEDURE — 86901 BLOOD TYPING SEROLOGIC RH(D): CPT | Performed by: OBSTETRICS & GYNECOLOGY

## 2017-11-01 PROCEDURE — 10907ZC DRAINAGE OF AMNIOTIC FLUID, THERAPEUTIC FROM PRODUCTS OF CONCEPTION, VIA NATURAL OR ARTIFICIAL OPENING: ICD-10-PCS | Performed by: OBSTETRICS & GYNECOLOGY

## 2017-11-01 PROCEDURE — 0HQ9XZZ REPAIR PERINEUM SKIN, EXTERNAL APPROACH: ICD-10-PCS | Performed by: OBSTETRICS & GYNECOLOGY

## 2017-11-01 PROCEDURE — 88307 TISSUE EXAM BY PATHOLOGIST: CPT | Performed by: OBSTETRICS & GYNECOLOGY

## 2017-11-01 PROCEDURE — 3E0R3BZ INTRODUCTION OF ANESTHETIC AGENT INTO SPINAL CANAL, PERCUTANEOUS APPROACH: ICD-10-PCS | Performed by: OBSTETRICS & GYNECOLOGY

## 2017-11-01 PROCEDURE — 00HU33Z INSERTION OF INFUSION DEVICE INTO SPINAL CANAL, PERCUTANEOUS APPROACH: ICD-10-PCS | Performed by: OBSTETRICS & GYNECOLOGY

## 2017-11-01 PROCEDURE — 12000031 ZZH R&B OB CRITICAL

## 2017-11-01 PROCEDURE — 36415 COLL VENOUS BLD VENIPUNCTURE: CPT | Performed by: OBSTETRICS & GYNECOLOGY

## 2017-11-01 PROCEDURE — 85025 COMPLETE CBC W/AUTO DIFF WBC: CPT | Performed by: OBSTETRICS & GYNECOLOGY

## 2017-11-01 PROCEDURE — 59025 FETAL NON-STRESS TEST: CPT

## 2017-11-01 PROCEDURE — 99214 OFFICE O/P EST MOD 30 MIN: CPT | Mod: 25

## 2017-11-01 PROCEDURE — 86900 BLOOD TYPING SEROLOGIC ABO: CPT | Performed by: OBSTETRICS & GYNECOLOGY

## 2017-11-01 RX ORDER — NALOXONE HYDROCHLORIDE 0.4 MG/ML
.1-.4 INJECTION, SOLUTION INTRAMUSCULAR; INTRAVENOUS; SUBCUTANEOUS
Status: DISCONTINUED | OUTPATIENT
Start: 2017-11-01 | End: 2017-11-01

## 2017-11-01 RX ORDER — METHYLERGONOVINE MALEATE 0.2 MG/ML
200 INJECTION INTRAVENOUS
Status: DISCONTINUED | OUTPATIENT
Start: 2017-11-01 | End: 2017-11-01

## 2017-11-01 RX ORDER — LIDOCAINE HYDROCHLORIDE AND EPINEPHRINE 15; 5 MG/ML; UG/ML
INJECTION, SOLUTION EPIDURAL PRN
Status: DISCONTINUED | OUTPATIENT
Start: 2017-11-01 | End: 2017-11-02

## 2017-11-01 RX ORDER — FENTANYL CITRATE 50 UG/ML
INJECTION, SOLUTION INTRAMUSCULAR; INTRAVENOUS
Status: COMPLETED
Start: 2017-11-01 | End: 2017-11-01

## 2017-11-01 RX ORDER — BISACODYL 10 MG
10 SUPPOSITORY, RECTAL RECTAL DAILY PRN
Status: DISCONTINUED | OUTPATIENT
Start: 2017-11-03 | End: 2017-11-02 | Stop reason: HOSPADM

## 2017-11-01 RX ORDER — EPHEDRINE SULFATE 50 MG/ML
5 INJECTION, SOLUTION INTRAMUSCULAR; INTRAVENOUS; SUBCUTANEOUS
Status: DISCONTINUED | OUTPATIENT
Start: 2017-11-01 | End: 2017-11-01

## 2017-11-01 RX ORDER — LIDOCAINE HYDROCHLORIDE 10 MG/ML
INJECTION, SOLUTION INFILTRATION; PERINEURAL PRN
Status: DISCONTINUED | OUTPATIENT
Start: 2017-11-01 | End: 2017-11-02

## 2017-11-01 RX ORDER — LIDOCAINE 40 MG/G
CREAM TOPICAL
Status: DISCONTINUED | OUTPATIENT
Start: 2017-11-01 | End: 2017-11-01

## 2017-11-01 RX ORDER — LANOLIN 100 %
OINTMENT (GRAM) TOPICAL
Status: DISCONTINUED | OUTPATIENT
Start: 2017-11-01 | End: 2017-11-02 | Stop reason: HOSPADM

## 2017-11-01 RX ORDER — NALBUPHINE HYDROCHLORIDE 10 MG/ML
2.5-5 INJECTION, SOLUTION INTRAMUSCULAR; INTRAVENOUS; SUBCUTANEOUS EVERY 6 HOURS PRN
Status: DISCONTINUED | OUTPATIENT
Start: 2017-11-01 | End: 2017-11-01

## 2017-11-01 RX ORDER — SODIUM CHLORIDE, SODIUM LACTATE, POTASSIUM CHLORIDE, CALCIUM CHLORIDE 600; 310; 30; 20 MG/100ML; MG/100ML; MG/100ML; MG/100ML
INJECTION, SOLUTION INTRAVENOUS CONTINUOUS
Status: DISCONTINUED | OUTPATIENT
Start: 2017-11-01 | End: 2017-11-01

## 2017-11-01 RX ORDER — MISOPROSTOL 200 UG/1
400 TABLET ORAL
Status: DISCONTINUED | OUTPATIENT
Start: 2017-11-01 | End: 2017-11-02 | Stop reason: HOSPADM

## 2017-11-01 RX ORDER — OXYCODONE AND ACETAMINOPHEN 5; 325 MG/1; MG/1
1 TABLET ORAL
Status: DISCONTINUED | OUTPATIENT
Start: 2017-11-01 | End: 2017-11-01

## 2017-11-01 RX ORDER — IBUPROFEN 800 MG/1
800 TABLET, FILM COATED ORAL
Status: DISCONTINUED | OUTPATIENT
Start: 2017-11-01 | End: 2017-11-01

## 2017-11-01 RX ORDER — OXYTOCIN/0.9 % SODIUM CHLORIDE 30/500 ML
340 PLASTIC BAG, INJECTION (ML) INTRAVENOUS CONTINUOUS PRN
Status: DISCONTINUED | OUTPATIENT
Start: 2017-11-01 | End: 2017-11-02 | Stop reason: HOSPADM

## 2017-11-01 RX ORDER — ONDANSETRON 2 MG/ML
4 INJECTION INTRAMUSCULAR; INTRAVENOUS EVERY 6 HOURS PRN
Status: DISCONTINUED | OUTPATIENT
Start: 2017-11-01 | End: 2017-11-01

## 2017-11-01 RX ORDER — OXYTOCIN/0.9 % SODIUM CHLORIDE 30/500 ML
1-24 PLASTIC BAG, INJECTION (ML) INTRAVENOUS CONTINUOUS
Status: DISCONTINUED | OUTPATIENT
Start: 2017-11-01 | End: 2017-11-01

## 2017-11-01 RX ORDER — FENTANYL CITRATE 50 UG/ML
INJECTION, SOLUTION INTRAMUSCULAR; INTRAVENOUS PRN
Status: DISCONTINUED | OUTPATIENT
Start: 2017-11-01 | End: 2017-11-02

## 2017-11-01 RX ORDER — OXYTOCIN 10 [USP'U]/ML
10 INJECTION, SOLUTION INTRAMUSCULAR; INTRAVENOUS
Status: DISCONTINUED | OUTPATIENT
Start: 2017-11-01 | End: 2017-11-01

## 2017-11-01 RX ORDER — ACETAMINOPHEN 325 MG/1
650 TABLET ORAL EVERY 4 HOURS PRN
Status: DISCONTINUED | OUTPATIENT
Start: 2017-11-01 | End: 2017-11-02 | Stop reason: HOSPADM

## 2017-11-01 RX ORDER — ROPIVACAINE HYDROCHLORIDE 2 MG/ML
INJECTION, SOLUTION EPIDURAL; INFILTRATION; PERINEURAL
Status: COMPLETED
Start: 2017-11-01 | End: 2017-11-01

## 2017-11-01 RX ORDER — ROPIVACAINE HYDROCHLORIDE 2 MG/ML
INJECTION, SOLUTION EPIDURAL; INFILTRATION; PERINEURAL PRN
Status: DISCONTINUED | OUTPATIENT
Start: 2017-11-01 | End: 2017-11-02

## 2017-11-01 RX ORDER — CARBOPROST TROMETHAMINE 250 UG/ML
250 INJECTION, SOLUTION INTRAMUSCULAR
Status: DISCONTINUED | OUTPATIENT
Start: 2017-11-01 | End: 2017-11-01

## 2017-11-01 RX ORDER — HYDROCORTISONE 2.5 %
CREAM (GRAM) TOPICAL 3 TIMES DAILY PRN
Status: DISCONTINUED | OUTPATIENT
Start: 2017-11-01 | End: 2017-11-02 | Stop reason: HOSPADM

## 2017-11-01 RX ORDER — OXYTOCIN/0.9 % SODIUM CHLORIDE 30/500 ML
100 PLASTIC BAG, INJECTION (ML) INTRAVENOUS CONTINUOUS
Status: DISCONTINUED | OUTPATIENT
Start: 2017-11-01 | End: 2017-11-02 | Stop reason: HOSPADM

## 2017-11-01 RX ORDER — NALOXONE HYDROCHLORIDE 0.4 MG/ML
.1-.4 INJECTION, SOLUTION INTRAMUSCULAR; INTRAVENOUS; SUBCUTANEOUS
Status: DISCONTINUED | OUTPATIENT
Start: 2017-11-01 | End: 2017-11-02 | Stop reason: HOSPADM

## 2017-11-01 RX ORDER — LIDOCAINE HYDROCHLORIDE 10 MG/ML
INJECTION, SOLUTION EPIDURAL; INFILTRATION; INTRACAUDAL; PERINEURAL
Status: COMPLETED
Start: 2017-11-01 | End: 2017-11-01

## 2017-11-01 RX ORDER — AMOXICILLIN 250 MG
1-2 CAPSULE ORAL 2 TIMES DAILY
Status: DISCONTINUED | OUTPATIENT
Start: 2017-11-01 | End: 2017-11-02 | Stop reason: HOSPADM

## 2017-11-01 RX ORDER — OXYTOCIN 10 [USP'U]/ML
10 INJECTION, SOLUTION INTRAMUSCULAR; INTRAVENOUS
Status: DISCONTINUED | OUTPATIENT
Start: 2017-11-01 | End: 2017-11-02 | Stop reason: HOSPADM

## 2017-11-01 RX ORDER — OXYCODONE HYDROCHLORIDE 5 MG/1
5-10 TABLET ORAL
Status: DISCONTINUED | OUTPATIENT
Start: 2017-11-01 | End: 2017-11-02 | Stop reason: HOSPADM

## 2017-11-01 RX ORDER — OXYTOCIN/0.9 % SODIUM CHLORIDE 30/500 ML
100-340 PLASTIC BAG, INJECTION (ML) INTRAVENOUS CONTINUOUS PRN
Status: COMPLETED | OUTPATIENT
Start: 2017-11-01 | End: 2017-11-01

## 2017-11-01 RX ORDER — IBUPROFEN 400 MG/1
400-800 TABLET, FILM COATED ORAL EVERY 6 HOURS PRN
Status: DISCONTINUED | OUTPATIENT
Start: 2017-11-01 | End: 2017-11-02 | Stop reason: HOSPADM

## 2017-11-01 RX ORDER — ACETAMINOPHEN 325 MG/1
650 TABLET ORAL EVERY 4 HOURS PRN
Status: DISCONTINUED | OUTPATIENT
Start: 2017-11-01 | End: 2017-11-01

## 2017-11-01 RX ADMIN — SODIUM CHLORIDE, POTASSIUM CHLORIDE, SODIUM LACTATE AND CALCIUM CHLORIDE: 600; 310; 30; 20 INJECTION, SOLUTION INTRAVENOUS at 08:39

## 2017-11-01 RX ADMIN — OXYTOCIN-SODIUM CHLORIDE 0.9% IV SOLN 30 UNIT/500ML 340 ML/HR: 30-0.9/5 SOLUTION at 18:06

## 2017-11-01 RX ADMIN — LIDOCAINE HYDROCHLORIDE,EPINEPHRINE BITARTRATE 3 ML: 15; .005 INJECTION, SOLUTION EPIDURAL; INFILTRATION; INTRACAUDAL; PERINEURAL at 15:18

## 2017-11-01 RX ADMIN — OXYTOCIN-SODIUM CHLORIDE 0.9% IV SOLN 30 UNIT/500ML 2 MILLI-UNITS/MIN: 30-0.9/5 SOLUTION at 08:39

## 2017-11-01 RX ADMIN — Medication 5 ML: at 15:23

## 2017-11-01 RX ADMIN — LIDOCAINE HYDROCHLORIDE 9 ML: 10 INJECTION, SOLUTION INFILTRATION; PERINEURAL at 15:09

## 2017-11-01 RX ADMIN — FENTANYL CITRATE 100 MCG: 50 INJECTION, SOLUTION INTRAMUSCULAR; INTRAVENOUS at 15:23

## 2017-11-01 RX ADMIN — IBUPROFEN 800 MG: 400 TABLET ORAL at 23:52

## 2017-11-01 RX ADMIN — Medication 5 ML: at 15:28

## 2017-11-01 RX ADMIN — BUPIVACAINE HYDROCHLORIDE 12 ML/HR: 5 INJECTION, SOLUTION EPIDURAL; INTRACAUDAL at 15:29

## 2017-11-01 NOTE — PROGRESS NOTES
SVE 5/80/0; pt feeling pain during contractions and breathing through them.  Would like epidural at this time.  Bolus given.  Amaury LINDSAY CRNA contacted to place epidural.

## 2017-11-01 NOTE — PROGRESS NOTES
Dr Preciado called for pt update; gave ok for pt to walk.  Pt tolerating contractions at this time; ambulating and using birthing ball.  See flowsheet for contraction pattern and FHR.  Will continue to monitor and titrate pitocin per order.

## 2017-11-01 NOTE — IP AVS SNAPSHOT
[facility]    [address] [address]    Phone:  [phone]    Fax:  [phone]                                       After Visit Summary   11/1/2017    Jocelyn Causey    MRN: [id number]           After Visit Summary Signature Page     I have received my discharge instructions, and my questions have been answered. I have discussed any challenges I see with this plan with the nurse or doctor.    ..........................................................................................................................................  Patient/Patient Representative Signature      ..........................................................................................................................................  Patient Representative Print Name and Relationship to Patient    ..................................................               ................................................  Date                                            Time    ..........................................................................................................................................  Reviewed by Signature/Title    ...................................................              ..............................................  Date                                                            Time

## 2017-11-01 NOTE — ANESTHESIA PROCEDURE NOTES
Peripheral nerve/Neuraxial procedure note : epidural catheter  Pre-Procedure  Performed by  MARY KAY CHRISTIAN   Location: OB    Procedure Times:11/1/2017 3:09 PM and 11/1/2017 3:17 PM  Pre-Anesthestic Checklist: patient identified, IV checked, risks and benefits discussed, informed consent, monitors and equipment checked, pre-op evaluation and at physician/surgeon's request    Timeout  Correct Patient: Yes   Correct Procedure: Yes   Correct Site: Yes   Correct Laterality: N/A   Correct Position: Yes   Site Marked: N/A   .   Procedure Documentation    Diagnosis:IUP, Gestational HTN, IOL, active labor.    Procedure:    Epidural catheter.  Insertion Site:L3-4  (midline approach) Injection technique: LORT saline   Local skin infiltrated with 9 mL of 1% lidocaine.  DEVONTE at 7 cm     Patient Prep;mask, sterile gloves, patient draped.  .  Needle: Touhy needle Needle Gauge: 17.    Needle Length (Inches) 3.5  # of attempts: 1 and # of redirects:  .   Catheter: 19 G . .  Catheter threaded easily  4 cm epidural space.  11 cm at skin.   .    Assessment/Narrative  Paresthesias: No.  .  .  Aspiration negative for heme or CSF  . Test dose of 3 mL lidocaine 1.5% w/ 1:200,000 epinephrine at 15:18.  Test dose negative for signs of intravascular, subdural or intrathecal injection. Comments:  VAS pain score prior to epidural:  7    VAS pain score after epidural:  3    Pt. Tolerated well, FHR stable.

## 2017-11-01 NOTE — PROGRESS NOTES
VSS; SVE 2/75/-2; FHR stable in the 150's. No evident contraction pattern at this time. Pit increased to 4mL/hr.  Pt resting; reports she is comfortable.  Will continue to monitor.

## 2017-11-01 NOTE — PROGRESS NOTES
Labs drawn at 0830. Pitocin started at 2mL/hr at 0839. VSS.  Pt plans to rest now - encouraged walking and use of birthing ball. Will continue to monitor.

## 2017-11-01 NOTE — ANESTHESIA PREPROCEDURE EVALUATION
Anesthesia Evaluation       history and physical reviewed .             ROS/MED HX    ENT/Pulmonary:  - neg pulmonary ROS     Neurologic:  - neg neurologic ROS     Cardiovascular: Comment: Ventricular ectopy  Gestational HTN with frontal headache        METS/Exercise Tolerance:     Hematologic:         Musculoskeletal:         GI/Hepatic:  - neg GI/hepatic ROS       Renal/Genitourinary:         Endo:         Psychiatric:         Infectious Disease:         Malignancy:         Other:                     Physical Exam  Normal systems: cardiovascular, pulmonary and dental    Airway   Mallampati: II  TM distance: > 3 FB  Neck ROM: full  Mouth opening: > 3 cm    Dental     Cardiovascular       Pulmonary           neg OB ROS                 Anesthesia Plan      History & Physical Review      ASA Status:  .  OB Epidural Asa: 2            Postoperative Care      Consents  Anesthetic plan, risks, benefits and alternatives discussed with:  Patient..                          .

## 2017-11-01 NOTE — PROGRESS NOTES
Pt arrived to [facility] to room 2052. VS taken - BP slightly elevated; pt denies s/s of preeclampsia at this time - reports taking tylenol this morning for headache.  Pt oriented to unit and room; discussed plan of care - pt agrees with plan. IV placed; LR started.  Pt denies pain but reports sharp pain in lower abdomen with contractions.  Pt resting at this time and will call with needs.  Will continue to monitor.

## 2017-11-01 NOTE — IP AVS SNAPSHOT
MRN:[id number]                      After Visit Summary   11/1/2017    Jocelyn Causey    MRN: [id number]           Thank you!     Thank you for choosing [facility] for your care. Our goal is always to provide you with excellent care. Hearing back from our patients is one way we can continue to improve our services. Please take a few minutes to complete the written survey that you may receive in the mail after you visit with us. Thank you!        Patient Information     Date Of Birth          1990        Designated Caregiver       Most Recent Value    Caregiver    Will someone help with your care after discharge? yes    Name of designated caregiver Burak      About your hospital stay     You were admitted on:  November 1, 2017 You last received care in the:  [facility]    You were discharged on:  November 2, 2017        Reason for your hospital stay       Maternity care                  Who to Call     For medical emergencies, please call 911.  For non-urgent questions about your medical care, please call your primary care provider or clinic, [phone]          Attending Provider     Provider Specialty    Ysabel Granado MD OB/Gyn       Primary Care Provider Office Phone # Fax #    [facility] [phone]      After Care Instructions     Activity       Review discharge instructions            Diet       Resume previous diet            Discharge Instructions - Gestational diabetic patients       Gestational diabetic patients to follow up for fasting blood sugar and 2 hour 75gm glucose load at 6 weeks postpartum.            Discharge Instructions - Hypertensive disorders patients       High Blood pressure patients to follow up in clinic or via home care within one week for a blood pressure check            Discharge Instructions - Postpartum visit       Schedule postpartum visit with your provider and return to clinic in 6  weeks.    Call if you have any of the following:  Temperature > 100.4  Foul smelling vaginal discharge  Bleeding > 1 pad per hour x 2 hrs,   Pain not controlled by oral pain meds  Severe constipation or severe nausea or vomiting.                  Further instructions from your care team       Postpartum Vaginal Delivery Instructions    Activity       Ask family and friends for help when you need it.    Do not place anything in your vagina for 6 weeks.    You are not restricted on other activities, but take it easy for a few weeks to allow your body to recover from delivery.  You are able to do any activities you feel up to that point.    No driving until you have stopped taking your pain medications (usually two weeks after delivery).     Call your health care provider if you have any of these symptoms:       Increased pain, swelling, redness, or fluid around your stiches from an episiotomy or perineal tear.    A fever above 100.4 F (38 C) with or without chills when placing a thermometer under your tongue.    You soak a sanitary pad with blood within 1 hour, or you see blood clots larger than a golf ball.    Bleeding that lasts more than 6 weeks.    Vaginal discharge that smells bad.    Severe pain, cramping or tenderness in your lower belly area.    A need to urinate more frequently (use the toilet more often), more urgently (use the toilet very quickly), or it burns when you urinate.    Nausea and vomiting.    Redness, swelling or pain around a vein in your leg.    Problems breastfeeding or a red or painful area on your breast.    Chest pain and cough or are gasping for air.    Problems coping with sadness, anxiety, or depression.  If you have any concerns about hurting yourself or the baby, call your provider immediately.     You have questions or concerns after you return home.     Keep your hands clean:  Always wash your hands before touching your perineal area and stitches.  This helps reduce your risk of  infection.  If your hands aren't dirty, you may use an alcohol hand-rub to clean your hands. Keep your nails clean and short.        Pending Results     Date and Time Order Name Status Description    11/1/2017 1826 Placenta path order and indications In process             Statement of Approval     Ordered          11/02/17 2113  I have reviewed and agree with all the recommendations and orders detailed in this document.  EFFECTIVE NOW     Approved and electronically signed by:  Arianna Peng MD             Admission Information     Date & Time Provider Department Dept. Phone    11/1/2017 Ysabel Granado MD [facility] [phone]      Your Vitals Were     Blood Pressure Pulse Temperature Respirations Last Period Pulse Oximetry    128/77 93 98.2  F (36.8  C) (Oral) 16 02/06/2017 99%      MyChart Information     MyChart gives you secure access to your electronic health record. If you see a primary care provider, you can also send messages to your care team and make appointments. If you have questions, please call your primary care clinic.  If you do not have a primary care provider, please call [phone] and they will assist you.        Care EveryWhere ID     This is your Care EveryWhere ID. This could be used by other organizations to access your [facility] medical records  [id number]        Equal Access to Services     MITZY BROWN : Lizz Valdovinos, waaxda luqadaha, qaybta kaalmada mitch, marta borges. So [facility] [phone].    ATENCIÓN: Si habla español, tiene a dotson disposición servicios gratuitos de asistencia lingüística. Llame al [phone].    We comply with applicable federal civil rights laws and [place] laws. We do not discriminate on the basis of race, color, national origin, age, disability, sex, sexual orientation, or gender identity.               Review of your medicines      START taking        Dose /  Directions    ibuprofen 600 MG tablet   Commonly known as:  ADVIL/MOTRIN        Dose:  600 mg   Take 1 tablet (600 mg) by mouth every 6 hours as needed for moderate pain   Quantity:  90 tablet   Refills:  1       senna-docusate 8.6-50 MG per tablet   Commonly known as:  SENOKOT-S;PERICOLACE        Dose:  1 tablet   Take 1 tablet by mouth 2 times daily   Quantity:  60 tablet   Refills:  1         CONTINUE these medicines which have NOT CHANGED        Dose / Directions    cetirizine 10 MG tablet   Commonly known as:  zyrTEC        Dose:  10 mg   Take 10 mg by mouth daily   Refills:  0       prenatal multivitamin plus iron 27-0.8 MG Tabs per tablet   Indication:  Pregnancy        Dose:  1 tablet   Take 1 tablet by mouth daily   Refills:  0       TYLENOL PO        Dose:  1000 mg   Take 1,000 mg by mouth daily as needed   Refills:  0       VITAMIN D (CHOLECALCIFEROL) PO        Dose:  5000 Units   Take 5,000 Units by mouth daily   Refills:  0            Where to get your medicines      These medications were sent to [facility] [place], [place] - [address]  [address] [address]     Phone:  [phone]     ibuprofen 600 MG tablet    senna-docusate 8.6-50 MG per tablet                Protect others around you: Learn how to safely use, store and throw away your medicines at www.disposemymeds.org.             Medication List: This is a list of all your medications and when to take them. Check marks below indicate your daily home schedule. Keep this list as a reference.      Medications           Morning Afternoon Evening Bedtime As Needed    cetirizine 10 MG tablet   Commonly known as:  zyrTEC   Take 10 mg by mouth daily                                ibuprofen 600 MG tablet   Commonly known as:  ADVIL/MOTRIN   Take 1 tablet (600 mg) by mouth every 6 hours as needed for moderate pain   Last time this was given:  800 mg on 11/2/2017  7:23 PM                                prenatal  multivitamin plus iron 27-0.8 MG Tabs per tablet   Take 1 tablet by mouth daily                                senna-docusate 8.6-50 MG per tablet   Commonly known as:  SENOKOT-S;PERICOLACE   Take 1 tablet by mouth 2 times daily   Last time this was given:  1 tablet on 11/2/2017  8:50 PM                                TYLENOL PO   Take 1,000 mg by mouth daily as needed                                VITAMIN D (CHOLECALCIFEROL) PO   Take 5,000 Units by mouth daily

## 2017-11-01 NOTE — L&D DELIVERY NOTE
"Delivery Summary    Jocelyn Causey MRN# [id number]   Age: 27 year old YOB: 1990     ASSESSMENT & PLAN: 27 year old  38w2d induced for gestational hypertension, pitocin, s/p AROM clear, epidural, tier 2 tracing, , viable female, \"Margarita\", 1st degree laceration repaired with 2-0 vicryl due to bleeding, placenta/spont/3vessel/intact with pitocin given at cord clamp, cervix/rectum intact, EBL 50ml  Ysabel Granado MD          Labor Event Times    Labor onset date:  17 Onset time:   9:45 AM   Dilation complete date:  17 Complete time:   5:52 PM   Start pushing date/time:  2017 1756            Labor Events     labor?:  No    steroids:  None   Labor Type:  AROM, Induction   Predominate monitoring during 1st stage:  continuous electronic fetal monitoring      Antibiotics received during labor?:  No      Rupture identifier:  Rupture 1   Rupture date/time: 17 0945   Rupture type:  Artificial Rupture of Membranes   Fluid color:  Clear   Fluid odor:  Normal      Induction:  Oxytocin   Induction date/time:     Cervical ripening date/time:     Indications for induction:  Hypertension      1:1 continuous labor support provided by?:  RN          Delivery/Placenta Date and Time    Delivery Date:  17 Delivery Time:   6:02 PM   Oxytocin given at the time of delivery:  after delivery of baby      Vaginal Counts    Initial count performed by 2 team members:   Two Team Members   JEANNIE Acevedo RN          Needles Suture [place] [employer] Instruments   Initial counts  2 5    Added to count 1      Final counts 1 2 5       Placed during labor Accounted for at the end of labor    NA   No NA   No NA      Final count performed by 2 team members:   Two Team Members   MAURI Acevedo RN         Final count correct?:  Yes         Apgars    Living status:  Living    1 Minute 5 Minute 10 Minute 15 Minute 20 Minute   Skin color: 1  1       Heart rate: 2  " 2       Reflex irritability: 2  2       Muscle tone: 1  2       Respiratory effort: 2  2       Total: 8  9          Apgars assigned by:  AUDREY BARCLAY RN/ JEANNIE LE RN      Cord    Vessels:  3 Vessels Complications:  None   Cord Blood Disposition:  Lab Gases Sent?:  No         [place] Resuscitation    Methods:  None      Output in Delivery Room:  Voided      Skin to Skin and Feeding Plan    Skin to skin initiation date/time: 17 1802   Skin to skin with:  Mother   Skin to skin end date/time:     How do you plan to feed your baby:  Breastfeeding      Labor Events and Shoulder Dystocia    Fetal Tracing Prior to Delivery:  Category 2            Delivery (Maternal) (Provider to Complete) (628588)    Episiotomy:  None   Perineal lacerations:  1st Repaired?:  Yes   Vaginal laceration?:  No    Cervical laceration?:  No    Est. blood loss (mL):  50         Mother's Information  Mother: Jocelyn Causey #[id number]    Start of Mother's Information     IO Blood Loss  17 0945 - 17 1827    Mom's I/O Activity            End of Mother's Information  Mother: Jocelyn Causey RHEA #[id number]            Delivery - Provider to Complete ([id number])    Delivering clinician:  ADALBERTO AMAYA   Attempted Delivery Types (Choose all that apply):  Spontaneous Vaginal Delivery   Delivery Type (Choose the 1 that will go to the Birth History):  Vaginal, Spontaneous Delivery                     Other personnel:   Provider Role   CHARBEL BARCLAY Delivery Nurse   ELDA LE Delivery Nurse   ALEXANDER PONCE Charge Nurse            Placenta    Delayed Cord Clamping:  Done   Removal:  Spontaneous   Disposition:  Pathology      Anesthesia    Method:  Epidural         Presentation and Position    Presentation:  Vertex    Occiput Anterior                    Adalberto Amaya MD

## 2017-11-01 NOTE — PROGRESS NOTES
Late Entry. NST reactive, B/P improved after laying down for last hr. BPP 8/8. Labs WNL. Dr rodríguez notified, see order to discharge home nowand  plan pitocin induction in am. Plan of care reviewed with pt and in agreement with plan, all questions answered. FKC and D/C instructions reviewed and copy given. Plans to return tomorrow at 0730. To car via w/c now with staff.

## 2017-11-01 NOTE — PROGRESS NOTES
FHR deceleration noted - assessed pt; pt reported she was moving and baby was moving at the time decel was noticed.  Pt reported sharp pain in lower abdomen with some contractions - pain tolerable at this time.  Will continue to closely monitor and titrate pitocin accordingly.

## 2017-11-01 NOTE — PROGRESS NOTES
(Late Entry) Multip at 38 weeks here from clinic for BPP with NST and labs for gestational HTN. Oriented to room and procedures. See flow sheet for B/P, ect. Awaiting U/S and lab. Dr Patten aware.

## 2017-11-01 NOTE — PROGRESS NOTES
MD at bedside; SVE completed; AROM - clear fluid. Pain plan discussed with patient. Pt plans to rest now and reevaluate pain plan as labor progresses.  Pt denies pain; reports comfortable at this time.  Will continue to monitor.

## 2017-11-01 NOTE — PROGRESS NOTES
Amaury LINDSAY CRNA at bedside at 1500 for epidural placement; consent signed; pt assisted to side of bed.   1540: Pt resting comfortably and denies pain; VSS - pt denies s/s of preeclampsia; denies nausea or dizziness.  Will continue to monitor closely and titrate pitocin per order.

## 2017-11-02 VITALS
SYSTOLIC BLOOD PRESSURE: 128 MMHG | RESPIRATION RATE: 16 BRPM | DIASTOLIC BLOOD PRESSURE: 77 MMHG | TEMPERATURE: 98.2 F | HEART RATE: 93 BPM | OXYGEN SATURATION: 99 %

## 2017-11-02 LAB — T PALLIDUM IGG+IGM SER QL: NEGATIVE

## 2017-11-02 PROCEDURE — 25000132 ZZH RX MED GY IP 250 OP 250 PS 637: Performed by: OBSTETRICS & GYNECOLOGY

## 2017-11-02 RX ORDER — AMOXICILLIN 250 MG
1 CAPSULE ORAL 2 TIMES DAILY
Qty: 60 TABLET | Refills: 1 | Status: SHIPPED | OUTPATIENT
Start: 2017-11-02 | End: 2021-09-06

## 2017-11-02 RX ORDER — IBUPROFEN 600 MG/1
600 TABLET, FILM COATED ORAL EVERY 6 HOURS PRN
Qty: 90 TABLET | Refills: 1 | Status: SHIPPED | OUTPATIENT
Start: 2017-11-02 | End: 2022-01-18

## 2017-11-02 RX ADMIN — SENNOSIDES AND DOCUSATE SODIUM 1 TABLET: 8.6; 5 TABLET ORAL at 20:50

## 2017-11-02 RX ADMIN — IBUPROFEN 800 MG: 400 TABLET ORAL at 12:40

## 2017-11-02 RX ADMIN — IBUPROFEN 800 MG: 400 TABLET ORAL at 19:23

## 2017-11-02 RX ADMIN — SENNOSIDES AND DOCUSATE SODIUM 1 TABLET: 8.6; 5 TABLET ORAL at 08:00

## 2017-11-02 RX ADMIN — IBUPROFEN 800 MG: 400 TABLET ORAL at 06:47

## 2017-11-02 NOTE — PROGRESS NOTES
PPD#1  Doing well. Pain well controlled on oral pain medication. Tolerating regular diet. Voiding well. Ambulating without difficulty. Lochia is scant. Breast feeding.     Vitals:    17 2016 17 2351 17 0800   BP: 145/87 135/82  136/79   Pulse:    99   Resp:  16 18 16   Temp:   97.6  F (36.4  C) 98  F (36.7  C)   TempSrc:   Oral Oral   SpO2:         General Appearance: NAD  Abdomen: Soft, NT, ND. Fundus firm at U-2  Extremities: NT, trace edema    Hemoglobin   Date Value Ref Range Status   2017 12.4 11.7 - 15.7 g/dL Final   10/31/2017 12.3 11.7 - 15.7 g/dL Final   ]    A/P: 27 year old  PPD#1 s/p . Hemodynamically stable.   -- gHTN: postpartum BPs have been normotensive to mild range BPs; will continue to monitor for s/s of postpartum pre-eclampsia  -- routine PP cares  -- encouraged to ambulate  -- anticipate discharge home tomorrow     Arianna Peng MD  [facility]

## 2017-11-02 NOTE — PLAN OF CARE
Problem: Postpartum (Vaginal Delivery) (Adult,Obstetrics,Pediatric)  Goal: Signs and Symptoms of Listed Potential Problems Will be Absent, Minimized or Managed (Postpartum)  Signs and symptoms of listed potential problems will be absent, minimized or managed by discharge/transition of care (reference Postpartum (Vaginal Delivery) (Adult,Obstetrics,Pediatric) CPG).   Outcome: Improving  Data: Vital signs within normal limits. Postpartum checks within normal limits - see flow record. Patient eating and drinking normally. Patient able to empty bladder independently. . Patient ambulating independently..   No apparent signs of infection. Lac 1st degree healing well. Patient Is performing self cares and Is able to care for infant. Positive attachment behaviors are observed with infant. Support persons are present.  Action:  Pain plan was discussed. Patient will request pain med when she is ready for it. Patient was not medicated during the shift for pt declined. . See MAR.Patient education done about postpartum cares, pain management/plan, fall risk and rest. See flow record.  Response:   Patient declined medication for pain.   Plan: Anticipate discharge on 11/3.     Pt bonding well with baby in nursery, will pump/hand express tonight. Vitals stable.      Zoë Weeks RN 11/1/2017 10:35 PM

## 2017-11-02 NOTE — PLAN OF CARE
Problem: Postpartum (Vaginal Delivery) (Adult,Obstetrics,Pediatric)  Goal: Signs and Symptoms of Listed Potential Problems Will be Absent, Minimized or Managed (Postpartum)  Signs and symptoms of listed potential problems will be absent, minimized or managed by discharge/transition of care (reference Postpartum (Vaginal Delivery) (Adult,Obstetrics,Pediatric) CPG).   Outcome: Improving  Data: Vital signs within normal limits. Postpartum checks within normal limits - see flow record. Patient eating and drinking normally. Patient able to empty bladder independently. . Patient ambulating independently..   No apparent signs of infection. Lac 1st degree healing well. Patient Is performing self cares and Is able to care for infant. Positive attachment behaviors are observed with infant. Support persons are present.  Action:  Pain plan was discussed. Patient will request pain med when she is ready for it. Patient was medicated during the shift for pain and cramping. See MAR.Patient education done about postpartum cares, pain management/plan and rest. See flow record.  Response:   Patient reassessed within 1 hour after each medication for pain. Patient stated that pain had improved. Patient stated that she was comfortable. .   Plan: Anticipate discharge on 11/3.     Baby in SCN. Pt visiting, positive attachment noted. Pumping in room, not getting any EBM at this time, will bring to nursery for baby when has some. PP checks WNL. IV out. Ambulating in halls.      Zoë Weeks RN 11/2/2017 1:14 AM

## 2017-11-02 NOTE — ANESTHESIA POSTPROCEDURE EVALUATION
Patient: Jocelyn Causey    * No procedures listed *    Diagnosis:* No pre-op diagnosis entered *  Diagnosis Additional Information: No value filed.    Anesthesia Type:  No value filed.    Note:  Anesthesia Post Evaluation    Patient location during evaluation: Bedside  Patient participation: Able to fully participate in evaluation  Level of consciousness: awake and alert  Pain management: adequate  Airway patency: patent  Cardiovascular status: acceptable  Respiratory status: acceptable  Hydration status: acceptable  PONV: none     Anesthetic complications: None          Last vitals:  Vitals:    11/01/17 2016 11/01/17 2351 11/02/17 0800   BP: 135/82  136/79   Pulse:   99   Resp: 16 18 16   Temp:  36.4  C (97.6  F) 36.7  C (98  F)   SpO2:            Electronically Signed By: KATHERINE Sheridan CRNA  November 2, 2017  11:15 AM

## 2017-11-02 NOTE — PLAN OF CARE
Problem: Labor (Cervical Ripen, Induct, Augment) (Adult,Obstetrics,Pediatric)  Goal: Signs and Symptoms of Listed Potential Problems Will be Absent, Minimized or Managed (Labor)  Signs and symptoms of listed potential problems will be absent, minimized or managed by discharge/transition of care (reference Labor (Cervical Ripen, Induct, Augment) (Adult,Obstetrics,Pediatric) CPG).   Outcome: Completed Date Met:  17  S:Delivery  B:Augmented  Labor,38w2d    Lab Results   Component Value Date     GBS Negative 10/16/2017    with antibiotic treatment not indicated 4 hours prior to delivery.  A: Patient delivered  lac 1st degree at 1802 with Dr. MAURI Granado in attendance and baby placed on mother's abdomen for delayed cord clamping. Baby dried and stimulated. Baby placed  skin to skin @ 1825. Apgars 8/9.  IV infusion of Oxytocin  infused. Placenta removal spontaneous. See Flowsheet for VS and PP checks. Labor care plan goals met, transition now to postpartum care.  R: Expect routine postpartum care. Anticipate first feeding within the hour or whenever infant displays feeding cues. Continue skin to skin. Prior discussion with mother indicates that feeding plan is Breast feeding. Educated mother on importance of exclusive breastfeeding, expected feeding readiness cues and encouraged her to observe for these cues while rooming in. Informed her that breastfeeding assistance would be provided.  Baby to warmer at 1830 d/t grunting - Sonia DE LA O PNP to bedside to evaluate infant - baby to [facility] at 1900 for high flow oxygen.  Parents educated on baby's condition and encouraged to visit [facility] when able.   Fundus firm, midline, U/U; small amount of bleeding. Denies s/s of PIH. Pt denies pain and is resting comfortably. Report given to Zoë FLEMING RN.

## 2017-11-06 LAB — COPATH REPORT: NORMAL

## 2017-11-09 NOTE — DISCHARGE SUMMARY
[facility] Discharge Summary    Jocelyn Causey MRN# [id number]   Age: 27 year old YOB: 1990     Date of Admission:  2017  Date of Discharge::  2017  Admitting Physician:  Ysabel Granado MD  Discharge Physician:  Arianna Peng MD     Home clinic: [facility]          Admission Diagnoses:     Intrauterine pregnancy at 38w2d  GBS negative status  Gestational hypertension          Discharge Diagnosis:   Viable female infant, delivered  S/p           Procedures:   Procedure(s): Spontaneous vaginal delivery       No other procedures performed during this admission           Medications Prior to Admission:     No prescriptions prior to admission.             Discharge Medications:     Discharge Medication List as of 2017  9:13 PM      START taking these medications    Details   ibuprofen (ADVIL/MOTRIN) 600 MG tablet Take 1 tablet (600 mg) by mouth every 6 hours as needed for moderate pain, Disp-90 tablet, R-1, E-Prescribe      senna-docusate (SENOKOT-S;PERICOLACE) 8.6-50 MG per tablet Take 1 tablet by mouth 2 times daily, Disp-60 tablet, R-1, E-Prescribe         CONTINUE these medications which have NOT CHANGED    Details   Acetaminophen (TYLENOL PO) Take 1,000 mg by mouth daily as needed , Historical      cetirizine (ZYRTEC) 10 MG tablet Take 10 mg by mouth daily, Historical      Prenatal Vit-Fe Fumarate-FA (PRENATAL MULTIVITAMIN  PLUS IRON) 27-0.8 MG TABS Take 1 tablet by mouth daily, Historical      VITAMIN D, CHOLECALCIFEROL, PO Take 5,000 Units by mouth daily , Historical                   Consultations:     No consultations were requested during this admission          Brief History of Labor:     Jocelyn Causey is a 27 year old female who is 38w2d pregnant and being admitted for induction of labor, indication pregnancy-induced hypertension. Labs normal, but with intermittent frontal headaches accompanied with flashing lights.  She had  "this at the end of her prior pregnancy.  No lof/vb/dc.  Good fm. She is rh negative and received rhogam.             Hospital Course:     27 year old  38w2d induced for gestational hypertension, pitocin, s/p AROM clear, epidural, tier 2 tracing, , viable female, \"Margarita\", 1st degree laceration repaired with 2-0 vicryl due to bleeding, placenta/spont/3vessel/intact with pitocin given at cord clamp, cervix/rectum intact, EBL 50 ml.    The patient's hospital course was unremarkable. Postpartum blood pressures were normotensive to mild range. she remained asymptomatic denying complaints of headache, visual changes and RUQ pain. On discharge, her pain was well controlled. Vaginal bleeding is similar to peak menstrual flow.  Voiding without difficulty.  Ambulating well and tolerating a normal diet.  No fever.  Breastfeeding well.  Infant is stable. Passing flatus, but no bowel movement yet.  She was discharged on post-partum day #1.    Post-partum hemoglobin:   Hemoglobin   Date Value Ref Range Status   2017 12.4 11.7 - 15.7 g/dL Final   10/31/2017 12.3 11.7 - 15.7 g/dL Final           Discharge Instructions and Follow-Up:   Discharge diet: Regular   Discharge activity: Activity as tolerated   Discharge follow-up: Follow up with primary care provider in 6 weeks   Wound care: Drink plenty of fluids           Discharge Disposition:   Discharged to home      Attestation:  I have reviewed today's vital signs, notes, medications, labs and imaging.    Arianna Peng MD   [facility]     "

## 2017-12-14 ENCOUNTER — PRENATAL OFFICE VISIT (OUTPATIENT)
Dept: OBGYN | Facility: CLINIC | Age: 27
End: 2017-12-14
Payer: COMMERCIAL

## 2017-12-14 VITALS
DIASTOLIC BLOOD PRESSURE: 87 MMHG | HEIGHT: 69 IN | BODY MASS INDEX: 29.86 KG/M2 | HEART RATE: 76 BPM | WEIGHT: 201.6 LBS | SYSTOLIC BLOOD PRESSURE: 124 MMHG

## 2017-12-14 DIAGNOSIS — Z30.430 ENCOUNTER FOR IUD INSERTION: ICD-10-CM

## 2017-12-14 PROBLEM — O13.9 GESTATIONAL HYPERTENSION: Status: RESOLVED | Noted: 2017-10-31 | Resolved: 2017-12-14

## 2017-12-14 PROBLEM — Z97.5 IUD (INTRAUTERINE DEVICE) IN PLACE: Status: ACTIVE | Noted: 2017-12-14

## 2017-12-14 PROBLEM — Z34.80 PRENATAL CARE, SUBSEQUENT PREGNANCY: Status: RESOLVED | Noted: 2017-03-24 | Resolved: 2017-12-14

## 2017-12-14 PROCEDURE — 99207 ZZC POST PARTUM EXAM: CPT | Performed by: OBSTETRICS & GYNECOLOGY

## 2017-12-14 PROCEDURE — 58300 INSERT INTRAUTERINE DEVICE: CPT | Performed by: OBSTETRICS & GYNECOLOGY

## 2017-12-14 ASSESSMENT — PATIENT HEALTH QUESTIONNAIRE - PHQ9: SUM OF ALL RESPONSES TO PHQ QUESTIONS 1-9: 0

## 2017-12-14 NOTE — NURSING NOTE
"Chief Complaint   Patient presents with     Post Partum Exam       Initial /87 (BP Location: Right arm, Patient Position: Sitting, Cuff Size: Adult Regular)  Pulse 76  Ht 5' 8.5\" (1.74 m)  Wt 201 lb 9.6 oz (91.4 kg)  LMP 02/06/2017  Breastfeeding? Yes  BMI 30.21 kg/m2 Estimated body mass index is 30.21 kg/(m^2) as calculated from the following:    Height as of this encounter: 5' 8.5\" (1.74 m).    Weight as of this encounter: 201 lb 9.6 oz (91.4 kg).  Medication Reconciliation: complete   Micheline Moses CMA      "

## 2017-12-14 NOTE — PROGRESS NOTES
"Jocelyn is a 27 year old female 6 weeks S/P  of a liveborn baby girl.  The delivery was uncomplicated.  She is not still bleeding.  She is breastfeeding, and has selected IUD for birth control.  Her last PAP smear was , and was Normal; her  PHQ-9 inventory score is: 0    Exam: /87 (BP Location: Right arm, Patient Position: Sitting, Cuff Size: Adult Regular)  Pulse 76  Ht 5' 8.5\" (1.74 m)  Wt 201 lb 9.6 oz (91.4 kg)  LMP 2017  Breastfeeding? Yes  BMI 30.21 kg/m2  perineal laceration healed, cervix parous and uterus small, non-tender, no adnexal masses    Assessment:  Postpartum    Plan:  RTC for annual exams and PRN      INDICATIONS:                                                    Is a pregnancy test required: No.       Was a consent obtained?  Yes                                              Jocelyn Causey is a 27 year old female,   who presents for Kailey IUD placement. The risks, benefits and alternatives of IUD placement were discussed in detail previously.   She has elected to go ahead with the procedure today and her questions were answered. Consent was signed.     Today's PHQ-2 Score:   PHQ-2 (  [employer]) 10/15/2014   Q1: Little interest or pleasure in doing things 0   Q2: Feeling down, depressed or hopeless 0   PHQ-2 Score 0          PROCEDURE:                                                                                                      Procedure note:The patient was given informed consent which was signed and dated.  The patient was placed in a supine position and a speculum placed with the vagina, to visualize the cervix.  It was prepped with iodine and the anterior cervix grasped with a tenaculum.  The cervix was sounded and the Kailey  IUD placed in the cavity at the fundus. The string was trimmed 2-3cm from the external cervical os.  A post-procedure ultrasound was not performed to assure the IUD was in place in the uterine cavity.  The patient was given " post-procedure instructions and left the clinic in stable condition.      POST PROCEDURE:                                                                                            She  tolerated the procedure well with minimal discomfort. There were no complications. Patient was discharged in stable condition.    Return to clinic in prn weeks for recheck.  Call if severe cramping, fever, abnormal bleeding, abnormal discharge or pelvic pain develop.      Juliette Davenport MD     INDICATIONS:                                                      Is a pregnancy test required: No.    PHQ-2 ( 1999 [employer]) 10/15/2014   Q1: Little interest or pleasure in doing things 0   Q2: Feeling down, depressed or hopeless 0   PHQ-2 Score 0         POST PROCEDURE:

## 2017-12-14 NOTE — MR AVS SNAPSHOT
"              After Visit Summary   12/14/2017    Jocelyn Causey    MRN: [id number]           Patient Information     Date Of Birth          1990        Visit Information        Provider Department      12/14/2017 10:00 AM Juliette Davenport MD [facility]        Today's Diagnoses     Routine postpartum follow-up    -  1    Encounter for IUD insertion           Follow-ups after your visit        Who to contact     If you have questions or need follow up information about today's clinic visit or your schedule please contact [facility] directly at [phone].  Normal or non-critical lab and imaging results will be communicated to you by Arashart, letter or phone within 4 business days after the clinic has received the results. If you do not hear from us within 7 days, please contact the clinic through Arashart or phone. If you have a critical or abnormal lab result, we will notify you by phone as soon as possible.  Submit refill requests through Doppelgames or call your pharmacy and they will forward the refill request to us. Please allow 3 business days for your refill to be completed.          Additional Information About Your Visit        MyChart Information     Doppelgames gives you secure access to your electronic health record. If you see a primary care provider, you can also send messages to your care team and make appointments. If you have questions, please call your primary care clinic.  If you do not have a primary care provider, please call [phone] and they will assist you.        Care EveryWhere ID     This is your Care EveryWhere ID. This could be used by other organizations to access your [facility] medical records  [id number]        Your Vitals Were     Pulse Height Last Period Breastfeeding? BMI (Body Mass Index)       76 5' 8.5\" (1.74 m) 02/06/2017 Yes 30.21 kg/m2        Blood Pressure from Last 3 Encounters:   12/14/17 124/87   11/02/17 128/77   10/31/17 (!) " 139/92    Weight from Last 3 Encounters:   17 201 lb 9.6 oz (91.4 kg)   10/31/17 228 lb (103.4 kg)   10/24/17 225 lb 3.2 oz (102.2 kg)              We Performed the Following     INSERTION INTRAUTERINE DEVICE     LEVONORGESTREL-RELEASE INTRAUTERINE CONTRACEPTIVE (SHANA), 13.5 MG        Primary Care Provider Fax #    Physician No Ref-Primary [phone]       No address on file        Equal Access to Services     MITZY BROWN : Hadii lavon chestero Sogreyali, waaxda luqadaha, qaybta kaalmada adeegyada, marta rochemajackie francois . [facility] [phone].    ATENCIÓN: Si tankla español, tiene a dotson disposición servicios gratuitos de asistencia lingüística. [facility] [phone].    We comply with applicable federal civil rights laws and [place] laws. We do not discriminate on the basis of race, color, national origin, age, disability, sex, sexual orientation, or gender identity.            Thank you!     Thank you for choosing [facility]  for your care. Our goal is always to provide you with excellent care. Hearing back from our patients is one way we can continue to improve our services. Please take a few minutes to complete the written survey that you may receive in the mail after your visit with us. Thank you!             Your Updated Medication List - Protect others around you: Learn how to safely use, store and throw away your medicines at www.disposemymeds.org.          This list is accurate as of: 17 10:15 AM.  Always use your most recent med list.                   Brand Name Dispense Instructions for use Diagnosis    cetirizine 10 MG tablet    zyrTEC     Take 10 mg by mouth daily        ibuprofen 600 MG tablet    ADVIL/MOTRIN    90 tablet    Take 1 tablet (600 mg) by mouth every 6 hours as needed for moderate pain     (normal spontaneous vaginal delivery)       prenatal multivitamin plus iron 27-0.8 MG Tabs per tablet      Take 1 tablet by mouth daily         senna-docusate 8.6-50 MG per tablet    SENOKOT-S;PERICOLACE    60 tablet    Take 1 tablet by mouth 2 times daily     (normal spontaneous vaginal delivery)       TYLENOL PO      Take 1,000 mg by mouth daily as needed        VITAMIN D (CHOLECALCIFEROL) PO      Take 5,000 Units by mouth daily

## 2019-09-19 NOTE — H&P
[facility] Labor and Delivery History and Physical    Jocelyn Causey MRN# [id number]   Age: 27 year old YOB: 1990     Date of Admission:  2017    Primary care provider: Mary York           Chief Complaint:   Jocelyn Causey is a 27 year old female who is 38w2d pregnant and being admitted for induction of labor, indication pregnancy-induced hypertension. Labs normal, but with intermittent frontal headaches accompanied with flashing lights.  She had this at the end of her prior pregnancy.  No lof/vb/dc.  Good fm. She is rh negative and received rhogam.            Pregnancy history:     OBSTETRIC HISTORY:    Obstetric History       T1      L1     SAB0   TAB0   Ectopic0   Multiple0   Live Births1       # Outcome Date GA Lbr Contreras/2nd Weight Sex Delivery Anes PTL Lv   2 Current            1 Term 05/15/15 40w3d 01:54 / 01:30 3.402 kg (7 lb 8 oz) F Vag-Spont EPI  DODIE      Name: [place]      Apgar1:  9                Apgar5: 9          EDC: Estimated Date of Delivery: 17    Prenatal Labs:   Lab Results   Component Value Date    ABO O 2017    RH  Neg 2017    AS Neg 2017    HEPBANG Nonreactive 2017    CHPCRT  2009     Negative for C. trachomatis rRNA by transcription mediated amplification.   A negative result by transcription mediated amplification does not preclude the   presence of C. trachomatis infection because results are dependent on proper   and adequate collection, absence of inhibitors, and sufficient rRNA to be   detected.    GCPCRT  2009     Negative for N. gonorrhoeae rRNA by transcription mediated amplification.   A negative result by transcription mediated amplification does not preclude the   presence of N. gonorrhoeae infection because results are dependent on proper   and adequate collection, absence of inhibitors, and sufficient rRNA to be   detected.    TREPAB Negative 2017    HGB 12.4 2017  From: Sandra Davila  To: LINDSEY Cowart  Sent: 9/19/2019 1:52 PM CDT  Subject: Referral Request    I have a lot of back and knee pain, so I thought maybe seeing someone in pain management? What do you think and where should I go?         GBS Status:   Lab Results   Component Value Date    GBS Negative 10/16/2017       Active Problem List  Patient Active Problem List   Diagnosis     CARDIOVASCULAR SCREENING; LDL GOAL LESS THAN 160     Ventricular ectopic beats     Rh negative state in antepartum period     Prenatal care, subsequent pregnancy     Gestational hypertension       Medication Prior to Admission  Prescriptions Prior to Admission   Medication Sig Dispense Refill Last Dose     Acetaminophen (TYLENOL PO)    Taking     cetirizine (ZYRTEC) 10 MG tablet Take 10 mg by mouth daily   Taking     Prenatal Vit-Fe Fumarate-FA (PRENATAL MULTIVITAMIN  PLUS IRON) 27-0.8 MG TABS Take 1 tablet by mouth daily   Taking     VITAMIN D, CHOLECALCIFEROL, PO Take 1,000 Units by mouth daily   Taking   .        Maternal Past Medical History:     Past Medical History:   Diagnosis Date     Chickenpox      Mild preeclampsia                        Family History:   I have reviewed this patient's family history            Social History:   I have reviewed this patient's social history         Review of Systems:   C: NEGATIVE for fever, chills, change in weight  E/M: NEGATIVE for ear, mouth and throat problems  R: NEGATIVE for significant cough or SOB  CV: NEGATIVE for chest pain, palpitations or peripheral edema          Physical Exam:   Vitals were reviewed  Temp: 97.7  F (36.5  C) Temp src: Oral BP: 130/84 Pulse: 99   Resp: 16 SpO2: 97 %        Constitutional:   awake, alert, cooperative, no apparent distress, and appears stated age     Lungs:   No increased work of breathing, good air exchange, clear to auscultation bilaterally, no crackles or wheezing     Cardiovascular:   Normal apical impulse, regular rate and rhythm, normal S1 and S2, no S3 or S4, and no murmur noted     Abdomen:   No scars, normal bowel sounds, soft, non-distended, non-tender, no masses palpated, no hepatosplenomegally     Genitounirinary:   External Genitalia:  General appearance; normal      Ext-trace edema, neg cords/homans  DTR 2+, no clonus    Cervix:   Membranes: AROM  clear amniotic fluid   Dilation: 4   Effacement: 80%   Station:-1   Consistency: average   Position: Posterior  Presentation:Cephalic  Fetal Heart Rate Tracing: reactive and reassuring, Tier 1 (normal)  Tocometer: external monitor and frequency q 3 minutes  Pitocin  EFW 7 1/2 lbs  Tested gynecoid pelvis                     Assessment:   Jocelyn Causey is a 38w2d pregnant female admitted with induction of labor, indication pregnancy-induced hypertension and AROM.          Plan:   Admit - see IP orders  Gestational hypertension-no evidence of pre-eclampsia, but headaches and visual changes are worrisome.  Discussed observation and if signs of CNS or increasing blood pressures, will add magnesium.  Discussed with her.  Questions answered and induction discussed again.  She desires to proceed.    Pain medication epidural or intrathecal  Discussed with Jocelyn Causey, the following; indications; the agents and methods of labor augmentation, including risks, benefits, and alternative approaches; and the possible need for  birth. EFW is AGA.    The Labor Induction:what you need to know information sheet was made available to her. Questions and concerns were addressed and patient agrees to above if necessary during the course of her labor.    Anticipate   Ysabel Granado MD

## 2019-11-19 ENCOUNTER — OFFICE VISIT (OUTPATIENT)
Dept: URGENT CARE | Facility: URGENT CARE | Age: 29
End: 2019-11-19
Payer: COMMERCIAL

## 2019-11-19 VITALS
TEMPERATURE: 99.1 F | DIASTOLIC BLOOD PRESSURE: 82 MMHG | SYSTOLIC BLOOD PRESSURE: 124 MMHG | HEART RATE: 108 BPM | OXYGEN SATURATION: 98 % | BODY MASS INDEX: 26.55 KG/M2 | RESPIRATION RATE: 17 BRPM | WEIGHT: 175.2 LBS | HEIGHT: 68 IN

## 2019-11-19 DIAGNOSIS — J02.9 SORE THROAT: Primary | ICD-10-CM

## 2019-11-19 LAB
DEPRECATED S PYO AG THROAT QL EIA: NORMAL
SPECIMEN SOURCE: NORMAL

## 2019-11-19 PROCEDURE — 87081 CULTURE SCREEN ONLY: CPT | Performed by: PHYSICIAN ASSISTANT

## 2019-11-19 PROCEDURE — 87880 STREP A ASSAY W/OPTIC: CPT | Performed by: PHYSICIAN ASSISTANT

## 2019-11-19 PROCEDURE — 99203 OFFICE O/P NEW LOW 30 MIN: CPT | Performed by: PHYSICIAN ASSISTANT

## 2019-11-19 ASSESSMENT — ENCOUNTER SYMPTOMS
CHILLS: 0
COUGH: 0
MYALGIAS: 0
ABDOMINAL PAIN: 0
SORE THROAT: 1
WHEEZING: 0
BACK PAIN: 0
SINUS PRESSURE: 0
NAUSEA: 0
PALPITATIONS: 0
ARTHRALGIAS: 0
SHORTNESS OF BREATH: 0
EYE PAIN: 0
CHEST TIGHTNESS: 0
UNEXPECTED WEIGHT CHANGE: 0
VOMITING: 0
EYE REDNESS: 0
RHINORRHEA: 0
FEVER: 0
TROUBLE SWALLOWING: 0
FATIGUE: 0
DIARRHEA: 0

## 2019-11-19 ASSESSMENT — MIFFLIN-ST. JEOR: SCORE: 1558.2

## 2019-11-19 NOTE — LETTER
[facility]  [address] [address]  Phone: [phone]  Fax: [phone]    November 19, 2019        Jocelyn Causey  [address] [address] [facility] [address]          To whom it may concern:    RE: Jocelyn Causey    Patient was seen and treated today at our clinic and missed work 11/18/19 through 11/20/19    Please contact me for questions or concerns.      Sincerely,        Tracie Haque PA-C

## 2019-11-20 LAB
BACTERIA SPEC CULT: NORMAL
SPECIMEN SOURCE: NORMAL

## 2019-11-20 NOTE — PROGRESS NOTES
SUBJECTIVE:   Jocelyn Causey is a 29 year old female presenting with a chief complaint of   Chief Complaint   Patient presents with     Pharyngitis     3 days sore throat chills and fever. Taking 800 ibuprofen every 6 hours and only gets relief for about 2 hours. Both ears started to hurt when she swallows.     Otalgia       She is a new patient of [facility].    URI Adult    Onset of symptoms was 3 day(s) ago.  Course of illness is same.    Severity moderate  Current and Associated symptoms: ear pain and sore throat  Treatment measures tried include Tylenol/Ibuprofen.  Predisposing factors include None.        Review of Systems   Constitutional: Negative for chills, fatigue, fever and unexpected weight change.   HENT: Positive for ear pain and sore throat. Negative for congestion, postnasal drip, rhinorrhea, sinus pressure and trouble swallowing.    Eyes: Negative for pain, redness and visual disturbance.   Respiratory: Negative for cough, chest tightness, shortness of breath and wheezing.    Cardiovascular: Negative for chest pain and palpitations.   Gastrointestinal: Negative for abdominal pain, diarrhea, nausea and vomiting.   Musculoskeletal: Negative for arthralgias, back pain and myalgias.   Skin: Negative for rash.       Past Medical History:   Diagnosis Date     Chickenpox      Mild preeclampsia      Family History   Problem Relation Age of Onset     Cardiovascular Paternal Grandfather         valve replacement     Pacemaker Paternal Grandfather      Substance Abuse Paternal Grandfather         alcohol     Cirrhosis Paternal Grandfather      Breast Cancer Maternal Grandmother      Cancer - colorectal Maternal Grandmother         also lung     Respiratory Paternal Grandmother      Genetic Disorder Other         Shelby Blackfan anemia     Genetic Disorder Other         Maternal 1st Cousin with Down syndrome     Current Outpatient Medications   Medication Sig Dispense Refill     Acetaminophen (TYLENOL PO) Take  "1,000 mg by mouth daily as needed        ibuprofen (ADVIL/MOTRIN) 600 MG tablet Take 1 tablet (600 mg) by mouth every 6 hours as needed for moderate pain 90 tablet 1     cetirizine (ZYRTEC) 10 MG tablet Take 10 mg by mouth daily       Prenatal Vit-Fe Fumarate-FA (PRENATAL MULTIVITAMIN  PLUS IRON) 27-0.8 MG TABS Take 1 tablet by mouth daily       senna-docusate (SENOKOT-S;PERICOLACE) 8.6-50 MG per tablet Take 1 tablet by mouth 2 times daily (Patient not taking: Reported on 12/14/2017) 60 tablet 1     VITAMIN D, CHOLECALCIFEROL, PO Take 5,000 Units by mouth daily        Social History     Tobacco Use     Smoking status: Never Smoker     Smokeless tobacco: Never Used   Substance Use Topics     Alcohol use: Yes     Alcohol/week: 2.5 standard drinks     Types: 3 Standard drinks or equivalent per week     Comment: occas- quit with pregnancy       OBJECTIVE  /82 (BP Location: Right arm, Patient Position: Sitting, Cuff Size: Adult Regular)   Pulse 108   Temp 99.1  F (37.3  C) (Tympanic)   Resp 17   Ht 1.727 m (5' 8\")   Wt 79.5 kg (175 lb 3.2 oz)   SpO2 98%   BMI 26.64 kg/m      Physical Exam  Constitutional:       Appearance: She is well-developed.   HENT:      Head: Normocephalic.      Right Ear: Tympanic membrane and ear canal normal.      Left Ear: Tympanic membrane and ear canal normal.      Mouth/Throat:      Pharynx: Posterior oropharyngeal erythema present.   Eyes:      Conjunctiva/sclera: Conjunctivae normal.      Pupils: Pupils are equal, round, and reactive to light.   Cardiovascular:      Rate and Rhythm: Normal rate.      Heart sounds: Normal heart sounds.   Pulmonary:      Effort: Pulmonary effort is normal.      Breath sounds: Normal breath sounds.   Skin:     General: Skin is warm and dry.      Findings: No rash.   Psychiatric:         Behavior: Behavior normal.         Labs:  Results for orders placed or performed in visit on 11/19/19 (from the past 24 hour(s))   Strep, Rapid Screen   Result " Value Ref Range    Specimen Description Throat     Rapid Strep A Screen       NEGATIVE: No Group A streptococcal antigen detected by immunoassay, await culture report.       X-Ray was not done.    ASSESSMENT:      ICD-10-CM    1. Sore throat J02.9 Strep, Rapid Screen     Beta strep group A culture        Medical Decision Making:    Differential Diagnosis:  URI Adult/Peds:  Acute right otitis media, Acute left otitis media, Strep pharyngitis, Tonsilitis, Viral pharyngitis, Viral syndrome and Viral upper respiratory illness    Serious Comorbid Conditions:  Adult:  None    PLAN:    URI Adult:  Rapid strep negative. This is likely viral. Continue with supportive care. Get plenty of rest and push fluids. Can use Tylenol and/or ibuprofen as needed for pain and/or fever control. Allow 7-10 days for symptoms to improve. Follow up as needed.      Followup:    If not improving or if condition worsens, follow up with your Primary Care Provider    There are no Patient Instructions on file for this visit.

## 2020-02-10 ENCOUNTER — HEALTH MAINTENANCE LETTER (OUTPATIENT)
Age: 30
End: 2020-02-10

## 2020-11-16 ENCOUNTER — HEALTH MAINTENANCE LETTER (OUTPATIENT)
Age: 30
End: 2020-11-16

## 2020-12-31 ENCOUNTER — OFFICE VISIT (OUTPATIENT)
Dept: OBGYN | Facility: CLINIC | Age: 30
End: 2020-12-31
Payer: COMMERCIAL

## 2020-12-31 VITALS
RESPIRATION RATE: 16 BRPM | HEIGHT: 69 IN | TEMPERATURE: 98.2 F | BODY MASS INDEX: 24.82 KG/M2 | SYSTOLIC BLOOD PRESSURE: 132 MMHG | HEART RATE: 84 BPM | DIASTOLIC BLOOD PRESSURE: 91 MMHG | WEIGHT: 167.6 LBS

## 2020-12-31 DIAGNOSIS — Z12.4 CERVICAL CANCER SCREENING: ICD-10-CM

## 2020-12-31 DIAGNOSIS — Z30.433 ENCOUNTER FOR REMOVAL AND REINSERTION OF INTRAUTERINE CONTRACEPTIVE DEVICE: Primary | ICD-10-CM

## 2020-12-31 DIAGNOSIS — Z30.430 ENCOUNTER FOR IUD INSERTION: ICD-10-CM

## 2020-12-31 PROBLEM — Z97.5 IUD (INTRAUTERINE DEVICE) IN PLACE: Status: RESOLVED | Noted: 2017-12-14 | Resolved: 2020-12-31

## 2020-12-31 LAB — HCG UR QL: NEGATIVE

## 2020-12-31 PROCEDURE — 81025 URINE PREGNANCY TEST: CPT | Performed by: ADVANCED PRACTICE MIDWIFE

## 2020-12-31 PROCEDURE — 58300 INSERT INTRAUTERINE DEVICE: CPT | Performed by: ADVANCED PRACTICE MIDWIFE

## 2020-12-31 PROCEDURE — G0145 SCR C/V CYTO,THINLAYER,RESCR: HCPCS | Performed by: ADVANCED PRACTICE MIDWIFE

## 2020-12-31 PROCEDURE — 58301 REMOVE INTRAUTERINE DEVICE: CPT | Performed by: ADVANCED PRACTICE MIDWIFE

## 2020-12-31 PROCEDURE — 87624 HPV HI-RISK TYP POOLED RSLT: CPT | Performed by: ADVANCED PRACTICE MIDWIFE

## 2020-12-31 ASSESSMENT — MIFFLIN-ST. JEOR: SCORE: 1526.67

## 2020-12-31 NOTE — NURSING NOTE
"Initial BP (!) 132/91 (BP Location: Right arm, Patient Position: Chair, Cuff Size: Adult Regular)   Pulse 84   Temp 98.2  F (36.8  C) (Tympanic)   Resp 16   Ht 1.74 m (5' 8.5\")   Wt 76 kg (167 lb 9.6 oz)   Breastfeeding No   BMI 25.11 kg/m   Estimated body mass index is 25.11 kg/m  as calculated from the following:    Height as of this encounter: 1.74 m (5' 8.5\").    Weight as of this encounter: 76 kg (167 lb 9.6 oz). .    Praveena Gusman, MARCIO    "

## 2021-01-05 LAB
COPATH REPORT: NORMAL
PAP: NORMAL

## 2021-01-06 LAB
FINAL DIAGNOSIS: NORMAL
HPV HR 12 DNA CVX QL NAA+PROBE: NEGATIVE
HPV16 DNA SPEC QL NAA+PROBE: NEGATIVE
HPV18 DNA SPEC QL NAA+PROBE: NEGATIVE
SPECIMEN DESCRIPTION: NORMAL
SPECIMEN SOURCE CVX/VAG CYTO: NORMAL

## 2021-04-03 ENCOUNTER — HEALTH MAINTENANCE LETTER (OUTPATIENT)
Age: 31
End: 2021-04-03

## 2021-04-29 ENCOUNTER — OFFICE VISIT (OUTPATIENT)
Dept: OBGYN | Facility: CLINIC | Age: 31
End: 2021-04-29
Payer: COMMERCIAL

## 2021-04-29 VITALS
HEIGHT: 69 IN | HEART RATE: 91 BPM | WEIGHT: 175.1 LBS | DIASTOLIC BLOOD PRESSURE: 95 MMHG | TEMPERATURE: 99 F | BODY MASS INDEX: 25.93 KG/M2 | RESPIRATION RATE: 16 BRPM | SYSTOLIC BLOOD PRESSURE: 114 MMHG

## 2021-04-29 DIAGNOSIS — Z30.432 ENCOUNTER FOR REMOVAL OF INTRAUTERINE CONTRACEPTIVE DEVICE: ICD-10-CM

## 2021-04-29 DIAGNOSIS — Z30.09 GENERAL COUNSELING FOR PRESCRIPTION OF ORAL CONTRACEPTIVES: Primary | ICD-10-CM

## 2021-04-29 PROCEDURE — 58301 REMOVE INTRAUTERINE DEVICE: CPT | Performed by: OBSTETRICS & GYNECOLOGY

## 2021-04-29 RX ORDER — NORGESTIMATE AND ETHINYL ESTRADIOL 0.25-0.035
1 KIT ORAL DAILY
Qty: 84 TABLET | Refills: 3 | Status: SHIPPED | OUTPATIENT
Start: 2021-04-29 | End: 2021-09-06

## 2021-04-29 ASSESSMENT — MIFFLIN-ST. JEOR: SCORE: 1555.69

## 2021-04-29 NOTE — PROGRESS NOTES
[facility]    IUD Removal Procedure Note      Jocelyn Causey  1990  [id number]    Indications:    Jocelyn Causey is a 31 year old year old female, who is here today for IUD removal. She has been having a lot of cystic acne since IUD replacement a few months ago. This wasn't an issue with her past Kailey IUD. Has read that the IUD could be causing this and wants to see how she does without any hormones. Aware that her mask and stress could be contributing to the acne.     The patient was counseled on the risks, benefits, and alternatives of the procedure. Desires to proceed.      Procedure:  Verbal and written consent were obtained. The patient was placed in the dorsal lithotomy position.  A speculum was placed in the vagina and the cervix with IUD strings were visualized. The strings were grasped with a forcep and removed intact. The IUD was placed in a sterile cup and disposed in hazardous waste. Patient tolerated the procedure well. There were no complications. EBL: 0cc.       Post Procedure:    Contraceptive plans: Wants to see how she does off of any hormones at this point to see if acne improved. Rx for combined OCPs sent if no improvement and she wants to start something else. Considering pregnancy in the fall so not looking for long term option. Has done well with condoms in the past.    Follow up: As needed      Renetta Vargas,

## 2021-06-18 ENCOUNTER — E-VISIT (OUTPATIENT)
Dept: URGENT CARE | Facility: URGENT CARE | Age: 31
End: 2021-06-18
Payer: COMMERCIAL

## 2021-06-18 DIAGNOSIS — Z20.822 SUSPECTED COVID-19 VIRUS INFECTION: Primary | ICD-10-CM

## 2021-06-18 PROCEDURE — 99421 OL DIG E/M SVC 5-10 MIN: CPT | Performed by: FAMILY MEDICINE

## 2021-06-18 NOTE — PATIENT INSTRUCTIONS
Dear Jocelyn Causey,    Your symptoms show that you may have coronavirus (COVID-19). This illness can cause fever, cough and trouble breathing. Many people get a mild case and get better on their own. Some people can get very sick.    Will I be tested for COVID-19?  We would like to test you for Covid-19 virus. I have placed orders for this test.     To schedule: go to your [employer] home page and scroll down to the section that says  You have an appointment that needs to be scheduled  and click the large green button that says  Schedule Now  and follow the steps to find the next available openings.    If you are unable to complete these [employer] scheduling steps, please call [phone] to schedule your testing.     Return to work/school/ guidance:  Please let your workplace manager and staffing office know when your quarantine ends     We can t give you an exact date as it depends on the above. You can calculate this on your own or work with your manager/staffing office to calculate this. (For example if you were exposed on 10/4, you would have to quarantine for 14 full days. That would be through 10/18. You could return on 10/19.)      If you receive a positive COVID-19 test result, follow the guidance of the those who are giving you the results. Usually the return to work is 10 (or in some cases 20 days from symptom onset.) If you work at [facility], you must also be cleared by Employee Occupational Health and Safety to return to work.        If you receive a negative COVID-19 test result and did not have a high risk exposure to someone with a known positive COVID-19 test, you can return to work once you're free of fever for 24 hours without fever-reducing medication and your symptoms are improving or resolved.      If you receive a negative COVID-19 test and If you had a high risk exposure to someone who has tested positive for COVID-19 then you can return to work 14 days after your last contact  with the positive individual    Note: If you have ongoing exposure to the covid positive person, this quarantine period may be more than 14 days. (For example, if you are continued to be exposed to your child who tested positive and cannot isolate from them, then the quarantine of 7-14 days can't start until your child is no longer contagious. This is typically 10 days from onset of the child's symptoms. So the total duration may be 17-24 days in this case.)    Sign up for [employer].   We know it's scary to hear that you might have COVID-19. We want to track your symptoms to make sure you're okay over the next 2 weeks. Please look for an email from [employer]--this is a free, online program that we'll use to keep in touch. To sign up, follow the link in the email you will receive. Learn more at [email or web address]    How can I take care of myself?    Get lots of rest. Drink extra fluids (unless a doctor has told you not to)    Take Tylenol (acetaminophen) or ibuprofen for fever or pain. If you have liver or kidney problems, ask your family doctor if it's okay to take Tylenol o ibuprofen    If you have other health problems (like cancer, heart failure, an organ transplant or severe kidney disease): Call your specialty clinic if you don't feel better in the next 2 days.    Know when to call 911. Emergency warning signs include:  o Trouble breathing or shortness of breath  o Pain or pressure in the chest that doesn't go away  o Feeling confused like you haven't felt before, or not being able to wake up  o Bluish-colored lips or face    Where can I get more information?  [facility] - About COVID-19:   [email or web address]    CDC - What to Do If You're Sick:   www.cdc.gov/coronavirus/2019-ncov/about/steps-when-sick.html    June 18, 2021  RE:  Jocelyn Causey                                                                                                                  [id number] [address]  [place] [id number]      To whom it may concern:    I evaluated Jocelyn Causey on June 18, 2021. Jocelyn Causey should be excused from work/school.     They should let their workplace manager and staffing office know when their quarantine ends.    We can not give an exact date as it depends on the information below. They can calculate this on their own or work with their manager/staffing office to calculate this. (For example if they were exposed on 10/04, they would have to quarantine for 14 full days. That would be through 10/18. They could return on 10/19.)    Quarantine Guidelines:      If patient receives a positive COVID-19 test result, they should follow the guidance of those who are giving the results. Usually the return to work is 10 (or in some cases 20 days from symptom onset.) If they work at [employer], they must be cleared by Employee Occupational Health and Safety to return to work.        If patient receives a negative COVID-19 test result and did not have a high risk exposure to someone with a known positive COVID-19 test, they can return to work once they're free of fever for 24 hours without fever-reducing medication and their symptoms are improving or resolved.      If patient receives a negative COVID-19 test and if they had a high risk exposure to someone who has tested positive for COVID-19 then they can return to work 14 days after their last contact with the positive individual    Note: If there is ongoing exposure to the covid positive person, this quarantine period may be longer than 14 days. (For example, if they are continually exposed to their child, who tested positive and cannot isolate from them, then the quarantine of 7-14 days can't start until their child is no longer contagious. This is typically 10 days from onset to the child's symptoms. So the total duration may be 17-24 days in this case.)     Sincerely,  Osmel Perez, DO

## 2021-06-20 DIAGNOSIS — Z20.822 SUSPECTED COVID-19 VIRUS INFECTION: ICD-10-CM

## 2021-06-20 LAB
SARS-COV-2 RNA RESP QL NAA+PROBE: NORMAL
SPECIMEN SOURCE: NORMAL

## 2021-06-20 PROCEDURE — U0005 INFEC AGEN DETEC AMPLI PROBE: HCPCS | Performed by: FAMILY MEDICINE

## 2021-06-20 PROCEDURE — U0003 INFECTIOUS AGENT DETECTION BY NUCLEIC ACID (DNA OR RNA); SEVERE ACUTE RESPIRATORY SYNDROME CORONAVIRUS 2 (SARS-COV-2) (CORONAVIRUS DISEASE [COVID-19]), AMPLIFIED PROBE TECHNIQUE, MAKING USE OF HIGH THROUGHPUT TECHNOLOGIES AS DESCRIBED BY CMS-2020-01-R: HCPCS | Performed by: FAMILY MEDICINE

## 2021-06-21 LAB
LABORATORY COMMENT REPORT: NORMAL
SARS-COV-2 RNA RESP QL NAA+PROBE: NEGATIVE
SPECIMEN SOURCE: NORMAL

## 2021-09-06 ENCOUNTER — E-VISIT (OUTPATIENT)
Dept: URGENT CARE | Facility: CLINIC | Age: 31
End: 2021-09-06
Payer: COMMERCIAL

## 2021-09-06 DIAGNOSIS — N39.0 ACUTE UTI (URINARY TRACT INFECTION): Primary | ICD-10-CM

## 2021-09-06 PROCEDURE — 99421 OL DIG E/M SVC 5-10 MIN: CPT

## 2021-09-06 RX ORDER — NITROFURANTOIN 25; 75 MG/1; MG/1
100 CAPSULE ORAL 2 TIMES DAILY
Qty: 10 CAPSULE | Refills: 0 | Status: SHIPPED | OUTPATIENT
Start: 2021-09-06 | End: 2021-09-11

## 2021-09-06 NOTE — PATIENT INSTRUCTIONS
Dear Jocelyn Causey    After reviewing your responses, I've been able to diagnose you with a urinary tract infection, which is a common infection of the bladder with bacteria.  This is not a sexually transmitted infection, though urinating immediately after intercourse can help prevent infections.  Drinking lots of fluids is also helpful to clear your current infection and prevent the next one.      I have sent a prescription for antibiotics to your pharmacy to treat this infection.    It is important that you take all of your prescribed medication even if your symptoms are improving after a few doses.  Taking all of your medicine helps prevent the symptoms from returning.     If your symptoms worsen, you develop pain in your back or stomach, develop fevers, or are not improving in 5 days, please contact your primary care provider for an appointment or visit any of our convenient Walk-in or Urgent Care Centers to be seen, which can be found on our website here.    Thanks again for choosing us as your health care partner,    Bre La, CNP

## 2021-09-12 ENCOUNTER — HEALTH MAINTENANCE LETTER (OUTPATIENT)
Age: 31
End: 2021-09-12

## 2021-11-01 NOTE — PROGRESS NOTES
Pt dc'd to home stable. Verbalized understanding of dc instructions.  Enc pt to call with concerns.       <-- Click to add NO pertinent Past Medical History

## 2022-01-18 ENCOUNTER — PRENATAL OFFICE VISIT (OUTPATIENT)
Dept: OBGYN | Facility: CLINIC | Age: 32
End: 2022-01-18
Payer: COMMERCIAL

## 2022-01-18 ENCOUNTER — APPOINTMENT (OUTPATIENT)
Dept: OBGYN | Facility: CLINIC | Age: 32
End: 2022-01-18
Payer: COMMERCIAL

## 2022-01-18 DIAGNOSIS — Z34.80 PRENATAL CARE OF MULTIGRAVIDA, ANTEPARTUM: Primary | ICD-10-CM

## 2022-01-18 PROCEDURE — 99207 PR NO CHARGE NURSE ONLY: CPT | Performed by: OBSTETRICS & GYNECOLOGY

## 2022-01-18 NOTE — PROGRESS NOTES
Denied need for review of teaching  [facility] OB Intake Nurse    Patient supplied answers from flow sheet for:  Prenatal OB Questionnaire.  Past Medical History  Have you ever recieved care for your mental health? : No  Have you ever been in a major accident or suffered serious trauma?: No  Within the last year, has anyone hit, slapped, kicked or otherwise hurt you?: No  In the last year, has anyone forced you to have sex when you didn't want to?: No    Past Medical History 2   Have you ever received a blood transfusion?: No  Would you accept a blood transfusion if was medically recommended?: Yes  Does anyone in your home smoke?: (!) Yes ()   Is your blood type Rh negative?: (!) Yes  Have you ever ?: (!) Yes  Have you been hospitalized for a nonsurgical reason excluding normal delivery?: No  Have you ever had an abnormal pap smear?: No    Past Medical History (Continued)  Do you have a history of abnormalities of the uterus?: No  Did your mother take PRERNA or any other hormones when she was pregnant with you?: No  Do you have any other problems we have not asked about which you feel may be important to this pregnancy?: No

## 2022-02-07 ENCOUNTER — PRENATAL OFFICE VISIT (OUTPATIENT)
Dept: OBGYN | Facility: CLINIC | Age: 32
End: 2022-02-07
Payer: COMMERCIAL

## 2022-02-07 VITALS
SYSTOLIC BLOOD PRESSURE: 134 MMHG | HEIGHT: 69 IN | RESPIRATION RATE: 18 BRPM | WEIGHT: 192 LBS | TEMPERATURE: 99.4 F | HEART RATE: 94 BPM | DIASTOLIC BLOOD PRESSURE: 75 MMHG | BODY MASS INDEX: 28.44 KG/M2

## 2022-02-07 DIAGNOSIS — Z34.81 PRENATAL CARE, SUBSEQUENT PREGNANCY IN FIRST TRIMESTER: Primary | ICD-10-CM

## 2022-02-07 PROBLEM — Z30.430 ENCOUNTER FOR IUD INSERTION: Status: RESOLVED | Noted: 2020-12-31 | Resolved: 2022-02-07

## 2022-02-07 LAB
ABO/RH(D): NORMAL
ALBUMIN UR-MCNC: NEGATIVE MG/DL
ANTIBODY SCREEN: NEGATIVE
APPEARANCE UR: CLEAR
BACTERIA #/AREA URNS HPF: ABNORMAL /HPF
BILIRUB UR QL STRIP: NEGATIVE
COLOR UR AUTO: YELLOW
ERYTHROCYTE [DISTWIDTH] IN BLOOD BY AUTOMATED COUNT: 12.6 % (ref 10–15)
GLUCOSE UR STRIP-MCNC: NEGATIVE MG/DL
HBV SURFACE AG SERPL QL IA: NONREACTIVE
HCT VFR BLD AUTO: 38.4 % (ref 35–47)
HCV AB SERPL QL IA: NONREACTIVE
HGB BLD-MCNC: 13.2 G/DL (ref 11.7–15.7)
HGB UR QL STRIP: NEGATIVE
HIV 1+2 AB+HIV1 P24 AG SERPL QL IA: NONREACTIVE
KETONES UR STRIP-MCNC: NEGATIVE MG/DL
LEUKOCYTE ESTERASE UR QL STRIP: NEGATIVE
MCH RBC QN AUTO: 29.7 PG (ref 26.5–33)
MCHC RBC AUTO-ENTMCNC: 34.4 G/DL (ref 31.5–36.5)
MCV RBC AUTO: 87 FL (ref 78–100)
MUCOUS THREADS #/AREA URNS LPF: PRESENT /LPF
NITRATE UR QL: NEGATIVE
PH UR STRIP: 5 [PH] (ref 5–7)
PLATELET # BLD AUTO: 301 10E3/UL (ref 150–450)
RBC # BLD AUTO: 4.44 10E6/UL (ref 3.8–5.2)
RBC #/AREA URNS AUTO: ABNORMAL /HPF
SP GR UR STRIP: >=1.03 (ref 1–1.03)
SPECIMEN EXPIRATION DATE: NORMAL
SQUAMOUS #/AREA URNS AUTO: ABNORMAL /LPF
T PALLIDUM AB SER QL: NONREACTIVE
UROBILINOGEN UR STRIP-ACNC: 0.2 E.U./DL
WBC # BLD AUTO: 11.6 10E3/UL (ref 4–11)
WBC #/AREA URNS AUTO: ABNORMAL /HPF

## 2022-02-07 PROCEDURE — 86780 TREPONEMA PALLIDUM: CPT | Performed by: OBSTETRICS & GYNECOLOGY

## 2022-02-07 PROCEDURE — 36415 COLL VENOUS BLD VENIPUNCTURE: CPT | Performed by: OBSTETRICS & GYNECOLOGY

## 2022-02-07 PROCEDURE — 81001 URINALYSIS AUTO W/SCOPE: CPT | Performed by: OBSTETRICS & GYNECOLOGY

## 2022-02-07 PROCEDURE — 87491 CHLMYD TRACH DNA AMP PROBE: CPT | Performed by: OBSTETRICS & GYNECOLOGY

## 2022-02-07 PROCEDURE — 87340 HEPATITIS B SURFACE AG IA: CPT | Performed by: OBSTETRICS & GYNECOLOGY

## 2022-02-07 PROCEDURE — 86803 HEPATITIS C AB TEST: CPT | Performed by: OBSTETRICS & GYNECOLOGY

## 2022-02-07 PROCEDURE — 86762 RUBELLA ANTIBODY: CPT | Performed by: OBSTETRICS & GYNECOLOGY

## 2022-02-07 PROCEDURE — 87086 URINE CULTURE/COLONY COUNT: CPT | Performed by: OBSTETRICS & GYNECOLOGY

## 2022-02-07 PROCEDURE — 86850 RBC ANTIBODY SCREEN: CPT | Performed by: OBSTETRICS & GYNECOLOGY

## 2022-02-07 PROCEDURE — 76817 TRANSVAGINAL US OBSTETRIC: CPT | Performed by: OBSTETRICS & GYNECOLOGY

## 2022-02-07 PROCEDURE — 87591 N.GONORRHOEAE DNA AMP PROB: CPT | Performed by: OBSTETRICS & GYNECOLOGY

## 2022-02-07 PROCEDURE — 86901 BLOOD TYPING SEROLOGIC RH(D): CPT | Performed by: OBSTETRICS & GYNECOLOGY

## 2022-02-07 PROCEDURE — 99207 PR FIRST OB VISIT: CPT | Performed by: OBSTETRICS & GYNECOLOGY

## 2022-02-07 PROCEDURE — 85027 COMPLETE CBC AUTOMATED: CPT | Performed by: OBSTETRICS & GYNECOLOGY

## 2022-02-07 PROCEDURE — 87389 HIV-1 AG W/HIV-1&-2 AB AG IA: CPT | Performed by: OBSTETRICS & GYNECOLOGY

## 2022-02-07 PROCEDURE — 86900 BLOOD TYPING SEROLOGIC ABO: CPT | Performed by: OBSTETRICS & GYNECOLOGY

## 2022-02-07 ASSESSMENT — MIFFLIN-ST. JEOR: SCORE: 1632.35

## 2022-02-07 NOTE — NURSING NOTE
"Initial /75 (BP Location: Right arm, Patient Position: Chair, Cuff Size: Adult Regular)   Pulse 94   Temp 99.4  F (37.4  C) (Tympanic)   Resp 18   Ht 1.74 m (5' 8.5\")   Wt 87.1 kg (192 lb)   LMP 12/06/2021   Breastfeeding No   BMI 28.77 kg/m   Estimated body mass index is 28.77 kg/m  as calculated from the following:    Height as of this encounter: 1.74 m (5' 8.5\").    Weight as of this encounter: 87.1 kg (192 lb). .      "

## 2022-02-07 NOTE — PROGRESS NOTES
Discussed physician coverage, tertiary support, diet, exercise, weight gain, schedule of visits, routine and indicated ultrasounds, childbirth education and antepartum testing for certain birth defects.  Encouraged patient to review contents of Prenatal Breastfeeding Education Toolkit. Offered opportunity to answer questions regarding the importance of skin to skin contact, early initiation of exclusive breastfeeding for the first six months and rooming in while in the hospital.    Syphilis is a sexually transmitted disease that can cause birth defects in the babies of untreated mothers. Every pregnant patient is tested for syphilis early in each pregnancy as part of the routine lab work. The [place] Department of [facility] has seen an increase in the rate of syphilis in [place]. The [facility] now recommends testing for syphilis 2 times during a pregnancy, the new prenatal visit, and 28 weeks or when admitted for delivery. Patient accepts lab testing for syphilis.    Group call support for deliveries was discussed, as well as tertiary support in event of high risk issues within [facility]".    Discussed current state of COVID-19 in pregnancy, when to seek out emergency care, how to self care at home with milder symptoms.  Discussed COVID vaccine, latest safety information, potential benefits in pregnancy to mom and baby (lower risk for hospitalization and death)        Options for  testing for birth defects were discussed with the patient, generally including CVS testing, Quad screen serum testing, nuchal lucency/blood marker testing, genetic amniocentesis, NIPT testing  and/or level 2 ultrasound.    Current issues include: nausea, moderate    Past medical, surgical, social and family histories reviewed on OB questionaire and included on episode summary.  Pertinent review of systems items stated above. See OB questionaire for pertinent components of HPI.    Past Medical History:  "  Diagnosis Date     Chickenpox      Mild preeclampsia     2015,2017     See epic chart    Past Surgical History:   Procedure Laterality Date     LASIK       MOUTH SURGERY      wisdom teeth     See epic chart    Patient Active Problem List    Diagnosis Date Noted     Prenatal care, subsequent pregnancy 03/24/2017     Priority: Medium     Rh negative status during pregnancy in first trimester 10/15/2014     Priority: Medium     Ventricular ectopic beats 09/26/2013     Priority: Medium     Documented by Holter; trial of abstinence from caffeine       CARDIOVASCULAR SCREENING; LDL GOAL LESS THAN 160 10/31/2010     Priority: Medium       OBJECTIVE: /75 (BP Location: Right arm, Patient Position: Chair, Cuff Size: Adult Regular)   Pulse 94   Temp 99.4  F (37.4  C) (Tympanic)   Resp 18   Ht 1.74 m (5' 8.5\")   Wt 87.1 kg (192 lb)   LMP 12/06/2021   Breastfeeding No   BMI 28.77 kg/m      GENERAL APPEARANCE: healthy, alert and no distress  ABDOMEN:  soft, nontender, no hepato-splenomegaly or hernias  PELVIC:  EGBUS:  within normal limits  VAGINA:  normoestrogenic, well-supported, no unusual discharge  CERVIX:  smooth, non-friable, no gross lesions, thin-layer PAP was not taken  UTERUS:  anteverted and enlarged, 10 weeks size  ADNEXAE:  non-tender, no masses palpable, no cul de sac nodularity     NECK: no adenopathy, no asymmetry, masses, or scars and thyroid normal to palpation     RESP: lungs clear to auscultation , respiratory effort WNL     CV: regular rates and rhythm, normal S1 S2, no S3 or S4 and no murmur, click or rub -     SKIN: no suspicious lesions or rashes     PSYCH: mentation appears normal. and affect normal/bright, oriented to person/place/time     LYMPHATICS: No axillary, cervical, inguinal, or supraclavicular nodes    Transvaginal ultrasound was performed. A live single intrauterine pregnancy was seen.  CRL=2.05 cm, c/w 8 weeks, 5 days.  EDC by sono =09/14/22  EDC by LMP=09/12/22+++    Fetal " heart motion was visualized.  SHH=243 bpm                ASSESSMENT:    ICD-10-CM    1. Prenatal care, subsequent pregnancy in first trimester  Z34.81 US OB <14 Weeks w Transvaginal Single     ABO/Rh type and screen     CBC with platelets     Hepatitis B surface antigen     Rubella Antibody IgG     HIV Antigen Antibody Combo     Treponema Abs w Reflex to RPR and Titer     Hepatitis C Screen Reflex to HCV RNA Quant and Genotype     UA with Microscopic     Urine Culture     Chlamydia trachomatis/Neisseria gonorrhoeae by PCR     Urine Microscopic Exam         PLAN: see orders and OB problem list annotations  Desires NIPT with gender  RH neg    Juliette Davenport MD

## 2022-02-08 LAB
BACTERIA UR CULT: NO GROWTH
C TRACH DNA SPEC QL PROBE+SIG AMP: NEGATIVE
N GONORRHOEA DNA SPEC QL NAA+PROBE: NEGATIVE
RUBV IGG SERPL QL IA: 1.16 INDEX
RUBV IGG SERPL QL IA: POSITIVE

## 2022-02-15 ENCOUNTER — ALLIED HEALTH/NURSE VISIT (OUTPATIENT)
Dept: OBGYN | Facility: CLINIC | Age: 32
End: 2022-02-15
Payer: COMMERCIAL

## 2022-02-15 VITALS — WEIGHT: 193.9 LBS | BODY MASS INDEX: 29.05 KG/M2

## 2022-02-15 DIAGNOSIS — Z34.81 PRENATAL CARE, SUBSEQUENT PREGNANCY IN FIRST TRIMESTER: Primary | ICD-10-CM

## 2022-02-15 PROCEDURE — 36415 COLL VENOUS BLD VENIPUNCTURE: CPT

## 2022-02-15 PROCEDURE — 99207 PR NO CHARGE LOS: CPT

## 2022-02-22 LAB — SCANNED LAB RESULT: NORMAL

## 2022-02-24 NOTE — RESULT ENCOUNTER NOTE
Call to pt to notify of below.  Unable to reach.  Left message for pt to call back.  Will also send [facility] PAChart message.    Ayleen Williamson   [facility]  RN

## 2022-03-07 ENCOUNTER — PRENATAL OFFICE VISIT (OUTPATIENT)
Dept: OBGYN | Facility: CLINIC | Age: 32
End: 2022-03-07
Payer: COMMERCIAL

## 2022-03-07 VITALS
WEIGHT: 199 LBS | SYSTOLIC BLOOD PRESSURE: 138 MMHG | BODY MASS INDEX: 29.82 KG/M2 | DIASTOLIC BLOOD PRESSURE: 85 MMHG | TEMPERATURE: 97.8 F | HEART RATE: 86 BPM

## 2022-03-07 DIAGNOSIS — Z34.81 PRENATAL CARE, SUBSEQUENT PREGNANCY IN FIRST TRIMESTER: Primary | ICD-10-CM

## 2022-03-07 PROCEDURE — 99207 PR PRENATAL VISIT: CPT | Performed by: OBSTETRICS & GYNECOLOGY

## 2022-03-07 NOTE — PROGRESS NOTES
CC: Here for routine prenatal visit @ 13w0d   HPI:  Feeling well; gender revealed as GIRL #3; reviewed lab results--all WNL    PE: /85 (BP Location: Right arm, Patient Position: Chair, Cuff Size: Adult Large)   Pulse 86   Temp 97.8  F (36.6  C) (Tympanic)   Wt 90.3 kg (199 lb)   LMP 12/06/2021   Breastfeeding No   BMI 29.82 kg/m     See OB flowsheet      A:  1. Prenatal care, subsequent pregnancy in first trimester        Routine prenatal care  RTC 4 weeks.      Juliette Davenport M.D.

## 2022-03-07 NOTE — NURSING NOTE
"Initial /85 (BP Location: Right arm, Patient Position: Chair, Cuff Size: Adult Large)   Pulse 86   Temp 97.8  F (36.6  C) (Tympanic)   Wt 90.3 kg (199 lb)   LMP 12/06/2021   Breastfeeding No   BMI 29.82 kg/m   Estimated body mass index is 29.82 kg/m  as calculated from the following:    Height as of 2/7/22: 1.74 m (5' 8.5\").    Weight as of this encounter: 90.3 kg (199 lb). .    Mishel Cooper MA    "

## 2022-03-14 ENCOUNTER — E-VISIT (OUTPATIENT)
Dept: OBGYN | Facility: CLINIC | Age: 32
End: 2022-03-14
Payer: COMMERCIAL

## 2022-03-14 DIAGNOSIS — L70.0 ACNE VULGARIS: Primary | ICD-10-CM

## 2022-03-14 PROCEDURE — 99422 OL DIG E/M SVC 11-20 MIN: CPT | Performed by: OBSTETRICS & GYNECOLOGY

## 2022-03-16 DIAGNOSIS — L70.9 ACNE, UNSPECIFIED ACNE TYPE: Primary | ICD-10-CM

## 2022-03-16 RX ORDER — CLINDAMYCIN PHOSPHATE 10 UG/ML
LOTION TOPICAL 2 TIMES DAILY
Qty: 60 ML | Refills: 1 | Status: ON HOLD | OUTPATIENT
Start: 2022-03-16 | End: 2022-08-24

## 2022-03-17 NOTE — TELEPHONE ENCOUNTER
Provider E-Visit time total (minutes): 15 mins  Juliette Davenport MD  [facility]

## 2022-04-04 ENCOUNTER — PRENATAL OFFICE VISIT (OUTPATIENT)
Dept: OBGYN | Facility: CLINIC | Age: 32
End: 2022-04-04
Payer: COMMERCIAL

## 2022-04-04 VITALS
BODY MASS INDEX: 30.6 KG/M2 | RESPIRATION RATE: 14 BRPM | HEIGHT: 69 IN | HEART RATE: 86 BPM | WEIGHT: 206.6 LBS | SYSTOLIC BLOOD PRESSURE: 123 MMHG | DIASTOLIC BLOOD PRESSURE: 78 MMHG | TEMPERATURE: 98.7 F

## 2022-04-04 DIAGNOSIS — Z34.82 PRENATAL CARE, SUBSEQUENT PREGNANCY IN SECOND TRIMESTER: Primary | ICD-10-CM

## 2022-04-04 PROCEDURE — 99207 PR PRENATAL VISIT: CPT | Performed by: OBSTETRICS & GYNECOLOGY

## 2022-04-04 NOTE — PROGRESS NOTES
"Initial /78 (BP Location: Left arm, Patient Position: Chair, Cuff Size: Adult Regular)   Pulse 86   Temp 98.7  F (37.1  C) (Tympanic)   Resp 14   Ht 1.74 m (5' 8.5\")   Wt 93.7 kg (206 lb 9.6 oz)   LMP 12/06/2021   BMI 30.96 kg/m   Estimated body mass index is 30.96 kg/m  as calculated from the following:    Height as of this encounter: 1.74 m (5' 8.5\").    Weight as of this encounter: 93.7 kg (206 lb 9.6 oz). .      "

## 2022-04-04 NOTE — PROGRESS NOTES
"CC: Here for routine prenatal visit @ 17w0d   HPI:  Feeling well, no complaints.      PE: /78 (BP Location: Left arm, Patient Position: Chair, Cuff Size: Adult Regular)   Pulse 86   Temp 98.7  F (37.1  C) (Tympanic)   Resp 14   Ht 1.74 m (5' 8.5\")   Wt 93.7 kg (206 lb 9.6 oz)   LMP 12/06/2021   BMI 30.96 kg/m      See OB flowsheet        A:  1. Prenatal care, subsequent pregnancy in first trimester           Routine prenatal care  RTC 4 weeks.  "

## 2022-04-24 ENCOUNTER — HEALTH MAINTENANCE LETTER (OUTPATIENT)
Age: 32
End: 2022-04-24

## 2022-04-25 ENCOUNTER — HOSPITAL ENCOUNTER (OUTPATIENT)
Dept: ULTRASOUND IMAGING | Facility: CLINIC | Age: 32
Discharge: HOME | End: 2022-04-25
Attending: OBSTETRICS & GYNECOLOGY | Admitting: OBSTETRICS & GYNECOLOGY
Payer: COMMERCIAL

## 2022-04-25 DIAGNOSIS — Z34.82 PRENATAL CARE, SUBSEQUENT PREGNANCY IN SECOND TRIMESTER: ICD-10-CM

## 2022-04-25 PROCEDURE — 76805 OB US >/= 14 WKS SNGL FETUS: CPT

## 2022-05-04 ENCOUNTER — PRENATAL OFFICE VISIT (OUTPATIENT)
Dept: OBGYN | Facility: CLINIC | Age: 32
End: 2022-05-04
Payer: COMMERCIAL

## 2022-05-04 VITALS
TEMPERATURE: 98.1 F | BODY MASS INDEX: 31.47 KG/M2 | DIASTOLIC BLOOD PRESSURE: 80 MMHG | WEIGHT: 212.5 LBS | HEART RATE: 86 BPM | RESPIRATION RATE: 18 BRPM | SYSTOLIC BLOOD PRESSURE: 111 MMHG | HEIGHT: 69 IN

## 2022-05-04 DIAGNOSIS — Z34.82 ENCOUNTER FOR SUPERVISION OF OTHER NORMAL PREGNANCY IN SECOND TRIMESTER: ICD-10-CM

## 2022-05-04 DIAGNOSIS — Z34.82 PRENATAL CARE, SUBSEQUENT PREGNANCY IN SECOND TRIMESTER: Primary | ICD-10-CM

## 2022-05-04 PROCEDURE — 99207 PR PRENATAL VISIT: CPT | Performed by: OBSTETRICS & GYNECOLOGY

## 2022-05-04 NOTE — PROGRESS NOTES
Concerns today: normal ULTRASOUND   Doing well.  No concerns today.  No nausea/vomiting. No heartburn.  No vaginal bleeding, no contractions/severe cramping, no leakage of fluid.  No vaginal discharge. No dysuria  No headache, vision changes, lower extremity swelling, upper abdominal pain, chest pain, shortness of breath.   Reportable signs and symptoms discussed.  RTC 4 weeks  Dimitri Patten MD

## 2022-06-06 ENCOUNTER — PRENATAL OFFICE VISIT (OUTPATIENT)
Dept: OBGYN | Facility: CLINIC | Age: 32
End: 2022-06-06
Payer: COMMERCIAL

## 2022-06-06 VITALS
WEIGHT: 218 LBS | TEMPERATURE: 98.7 F | RESPIRATION RATE: 18 BRPM | HEIGHT: 69 IN | DIASTOLIC BLOOD PRESSURE: 86 MMHG | HEART RATE: 101 BPM | SYSTOLIC BLOOD PRESSURE: 139 MMHG | BODY MASS INDEX: 32.29 KG/M2

## 2022-06-06 DIAGNOSIS — Z34.82 PRENATAL CARE, SUBSEQUENT PREGNANCY IN SECOND TRIMESTER: Primary | ICD-10-CM

## 2022-06-06 LAB
ERYTHROCYTE [DISTWIDTH] IN BLOOD BY AUTOMATED COUNT: 12.9 % (ref 10–15)
GLUCOSE 1H P 50 G GLC PO SERPL-MCNC: 132 MG/DL (ref 70–129)
HCT VFR BLD AUTO: 35.1 % (ref 35–47)
HGB BLD-MCNC: 11.8 G/DL (ref 11.7–15.7)
MCH RBC QN AUTO: 30.3 PG (ref 26.5–33)
MCHC RBC AUTO-ENTMCNC: 33.6 G/DL (ref 31.5–36.5)
MCV RBC AUTO: 90 FL (ref 78–100)
PLATELET # BLD AUTO: 257 10E3/UL (ref 150–450)
RBC # BLD AUTO: 3.9 10E6/UL (ref 3.8–5.2)
WBC # BLD AUTO: 15.4 10E3/UL (ref 4–11)

## 2022-06-06 PROCEDURE — 86780 TREPONEMA PALLIDUM: CPT | Performed by: OBSTETRICS & GYNECOLOGY

## 2022-06-06 PROCEDURE — 99207 PR PRENATAL VISIT: CPT | Performed by: OBSTETRICS & GYNECOLOGY

## 2022-06-06 PROCEDURE — 85027 COMPLETE CBC AUTOMATED: CPT | Performed by: OBSTETRICS & GYNECOLOGY

## 2022-06-06 PROCEDURE — 36415 COLL VENOUS BLD VENIPUNCTURE: CPT | Performed by: OBSTETRICS & GYNECOLOGY

## 2022-06-06 PROCEDURE — 82950 GLUCOSE TEST: CPT | Performed by: OBSTETRICS & GYNECOLOGY

## 2022-06-06 NOTE — PROGRESS NOTES
"CC: Here for routine prenatal visit @ 26w0d   HPI:  Feeling well although more pelvic instability this pregnancy; reminded about importance of hydration, need for RHOGAM at 28 weeks    PE: /86 (BP Location: Right arm, Patient Position: Chair, Cuff Size: Adult Regular)   Pulse 101   Temp 98.7  F (37.1  C) (Tympanic)   Resp 18   Ht 1.74 m (5' 8.5\")   Wt 98.9 kg (218 lb)   LMP 12/06/2021   Breastfeeding No   BMI 32.66 kg/m     See OB flowsheet      A:  1. Prenatal care, subsequent pregnancy in second trimester        Routine prenatal care  RTC 2 weeks.      Juliette Davenport M.D.     "

## 2022-06-06 NOTE — NURSING NOTE
"Initial /86 (BP Location: Right arm, Patient Position: Chair, Cuff Size: Adult Regular)   Pulse 101   Temp 98.7  F (37.1  C) (Tympanic)   Resp 18   Ht 1.74 m (5' 8.5\")   Wt 98.9 kg (218 lb)   LMP 12/06/2021   Breastfeeding No   BMI 32.66 kg/m   Estimated body mass index is 32.66 kg/m  as calculated from the following:    Height as of this encounter: 1.74 m (5' 8.5\").    Weight as of this encounter: 98.9 kg (218 lb). .      "

## 2022-06-07 DIAGNOSIS — Z34.82 PRENATAL CARE, SUBSEQUENT PREGNANCY IN SECOND TRIMESTER: Primary | ICD-10-CM

## 2022-06-07 LAB — T PALLIDUM AB SER QL: NONREACTIVE

## 2022-06-22 ENCOUNTER — PRENATAL OFFICE VISIT (OUTPATIENT)
Dept: OBGYN | Facility: CLINIC | Age: 32
End: 2022-06-22
Payer: COMMERCIAL

## 2022-06-22 VITALS
TEMPERATURE: 98.3 F | HEIGHT: 69 IN | SYSTOLIC BLOOD PRESSURE: 131 MMHG | RESPIRATION RATE: 16 BRPM | BODY MASS INDEX: 33.33 KG/M2 | WEIGHT: 225 LBS | HEART RATE: 93 BPM | DIASTOLIC BLOOD PRESSURE: 74 MMHG

## 2022-06-22 DIAGNOSIS — Z34.83 ENCOUNTER FOR SUPERVISION OF OTHER NORMAL PREGNANCY IN THIRD TRIMESTER: Primary | ICD-10-CM

## 2022-06-22 DIAGNOSIS — O26.891 RH NEGATIVE STATUS DURING PREGNANCY IN FIRST TRIMESTER: ICD-10-CM

## 2022-06-22 DIAGNOSIS — Z67.91 RH NEGATIVE STATUS DURING PREGNANCY IN FIRST TRIMESTER: ICD-10-CM

## 2022-06-22 PROCEDURE — 90471 IMMUNIZATION ADMIN: CPT | Performed by: OBSTETRICS & GYNECOLOGY

## 2022-06-22 PROCEDURE — 90715 TDAP VACCINE 7 YRS/> IM: CPT | Performed by: OBSTETRICS & GYNECOLOGY

## 2022-06-22 PROCEDURE — 99207 PR PRENATAL VISIT: CPT | Performed by: OBSTETRICS & GYNECOLOGY

## 2022-06-22 PROCEDURE — 96372 THER/PROPH/DIAG INJ SC/IM: CPT | Performed by: OBSTETRICS & GYNECOLOGY

## 2022-06-22 NOTE — NURSING NOTE
"Initial /74 (BP Location: Right arm, Patient Position: Chair, Cuff Size: Adult Regular)   Pulse 93   Temp 98.3  F (36.8  C) (Tympanic)   Resp 16   Ht 1.74 m (5' 8.5\")   Wt 102.1 kg (225 lb)   LMP 12/06/2021   BMI 33.71 kg/m   Estimated body mass index is 33.71 kg/m  as calculated from the following:    Height as of this encounter: 1.74 m (5' 8.5\").    Weight as of this encounter: 102.1 kg (225 lb). .      "

## 2022-06-22 NOTE — PROGRESS NOTES
Prior to immunization administration, verified patients identity using patient s name and date of birth. Please see Immunization Activity for additional information.     Screening Questionnaire for Adult Immunization    Are you sick today?   No   Do you have allergies to medications, food, a vaccine component or latex?   No   Have you ever had a serious reaction after receiving a vaccination?   No   Do you have a long-term health problem with heart, lung, kidney, or metabolic disease (e.g., diabetes), asthma, a blood disorder, no spleen, complement component deficiency, a cochlear implant, or a spinal fluid leak?  Are you on long-term aspirin therapy?   No   Do you have cancer, leukemia, HIV/AIDS, or any other immune system problem?   No   Do you have a parent, brother, or sister with an immune system problem?   No   In the past 3 months, have you taken medications that affect  your immune system, such as prednisone, other steroids, or anticancer drugs; drugs for the treatment of rheumatoid arthritis, Crohn s disease, or psoriasis; or have you had radiation treatments?   No   Have you had a seizure, or a brain or other nervous system problem?   No   During the past year, have you received a transfusion of blood or blood    products, or been given immune (gamma) globulin or antiviral drug?   No   For women: Are you pregnant or is there a chance you could become       pregnant during the next month?   Yes   Have you received any vaccinations in the past 4 weeks?   No     Immunization questionnaire was positive for at least one answer.  Notified Dr Patten.        Per orders of Dr. Patten, injection of Adacel given by Gloria Diaz LPN. Patient instructed to remain in clinic for 15 minutes afterwards, and to report any adverse reaction to me immediately.       Screening performed by Gloria Diaz LPN on 6/22/2022 at 9:28 AM.

## 2022-06-22 NOTE — PROGRESS NOTES
Clinic Administered Medication Documentation    Administrations This Visit     rho(D) immune globulin (RHOGAM) injection 300 mcg     Admin Date  06/22/2022 Action  Given Dose  300 mcg Route  Intramuscular Site  Right Ventrogluteal Administered By  Gloria Diaz LPN    Ordering Provider: Dimitri Patten MD    Patient Supplied?: No                  Injectable Medication Documentation    Patient was given Rhogam. Prior to medication administration, verified patients identity using patient s name and date of birth. Please see MAR and medication order for additional information. Patient instructed to remain in clinic for 15 minutes.      Was entire vial of medication used? Yes  Vial/Syringe: Single dose vial  Expiration Date:  10/15/23  Was this medication supplied by the patient? No

## 2022-06-22 NOTE — PROGRESS NOTES
Concerns: had some Left sided pain yesterday that resolved overnight  No vaginal bleeding, loss of fluid, or contractions  Tdap given today  Discussed kick counts and fetal movement.  Discussed PTL, PROM, and when to call or come in.  RTC in 2 weeks.      Dimitri Patten MD

## 2022-06-24 ENCOUNTER — LAB (OUTPATIENT)
Dept: LAB | Facility: CLINIC | Age: 32
End: 2022-06-24
Payer: COMMERCIAL

## 2022-06-24 DIAGNOSIS — Z34.82 PRENATAL CARE, SUBSEQUENT PREGNANCY IN SECOND TRIMESTER: ICD-10-CM

## 2022-06-24 LAB
GESTATIONAL GTT 1 HR POST DOSE: 185 MG/DL (ref 60–179)
GESTATIONAL GTT 2 HR POST DOSE: 147 MG/DL (ref 60–154)
GESTATIONAL GTT 3 HR POST DOSE: 95 MG/DL (ref 60–139)
GLUCOSE P FAST SERPL-MCNC: 85 MG/DL (ref 60–94)

## 2022-06-24 PROCEDURE — 36415 COLL VENOUS BLD VENIPUNCTURE: CPT

## 2022-06-24 PROCEDURE — 82952 GTT-ADDED SAMPLES: CPT

## 2022-06-24 PROCEDURE — 82951 GLUCOSE TOLERANCE TEST (GTT): CPT

## 2022-07-07 ENCOUNTER — PRENATAL OFFICE VISIT (OUTPATIENT)
Dept: OBGYN | Facility: CLINIC | Age: 32
End: 2022-07-07
Payer: COMMERCIAL

## 2022-07-07 VITALS
BODY MASS INDEX: 34.16 KG/M2 | WEIGHT: 228 LBS | TEMPERATURE: 98 F | DIASTOLIC BLOOD PRESSURE: 88 MMHG | SYSTOLIC BLOOD PRESSURE: 128 MMHG | HEART RATE: 90 BPM

## 2022-07-07 DIAGNOSIS — Z34.93 PRENATAL CARE, THIRD TRIMESTER: Primary | ICD-10-CM

## 2022-07-07 PROCEDURE — 99207 PR PRENATAL VISIT: CPT | Performed by: OBSTETRICS & GYNECOLOGY

## 2022-07-07 NOTE — NURSING NOTE
"Initial /88 (BP Location: Right arm, Patient Position: Chair, Cuff Size: Adult Regular)   Pulse 90   Temp 98  F (36.7  C) (Tympanic)   Wt 103.4 kg (228 lb)   LMP 12/06/2021   Breastfeeding No   BMI 34.16 kg/m   Estimated body mass index is 34.16 kg/m  as calculated from the following:    Height as of 6/22/22: 1.74 m (5' 8.5\").    Weight as of this encounter: 103.4 kg (228 lb). .    Mishel Cooper MA    "

## 2022-07-07 NOTE — PROGRESS NOTES
CC: Here for routine prenatal visit @ 30w3d   HPI:  Feeling well, no complaints.      PE:/88 (BP Location: Right arm, Patient Position: Chair, Cuff Size: Adult Regular)   Pulse 90   Temp 98  F (36.7  C) (Tympanic)   Wt 103.4 kg (228 lb)   LMP 12/06/2021   Breastfeeding No   BMI 34.16 kg/m        FH 31  Doptones 150s     A:  1. Prenatal care, subsequent pregnancy in first trimester   s/p rhogam, tdap  Elevated GCT, passed GTT  S/p covid vaccine  NIPT WNL     Routine prenatal care  RTC 2 weeks.    Louann Todd MD   7/7/2022 3:44 PM

## 2022-07-21 ENCOUNTER — PRENATAL OFFICE VISIT (OUTPATIENT)
Dept: OBGYN | Facility: CLINIC | Age: 32
End: 2022-07-21
Payer: COMMERCIAL

## 2022-07-21 VITALS
HEIGHT: 69 IN | RESPIRATION RATE: 16 BRPM | DIASTOLIC BLOOD PRESSURE: 72 MMHG | SYSTOLIC BLOOD PRESSURE: 118 MMHG | WEIGHT: 232.4 LBS | BODY MASS INDEX: 34.42 KG/M2 | HEART RATE: 93 BPM | TEMPERATURE: 98.1 F

## 2022-07-21 DIAGNOSIS — Z34.83 ENCOUNTER FOR SUPERVISION OF OTHER NORMAL PREGNANCY IN THIRD TRIMESTER: Primary | ICD-10-CM

## 2022-07-21 PROCEDURE — 99207 PR PRENATAL VISIT: CPT | Performed by: OBSTETRICS & GYNECOLOGY

## 2022-07-21 RX ORDER — BREAST PUMP
1 EACH MISCELLANEOUS SEE ADMIN INSTRUCTIONS
Qty: 1 EACH | Refills: 0 | Status: SHIPPED | OUTPATIENT
Start: 2022-07-21

## 2022-07-21 NOTE — PROGRESS NOTES
Concerns:   Doing well.  No concerns today.  Discussed kick counts and fetal movement.  Discussed PTL, PROM, and when to call or come in.  RTC 2 weeks.    Dimitri Patten MD

## 2022-08-08 ENCOUNTER — PRENATAL OFFICE VISIT (OUTPATIENT)
Dept: OBGYN | Facility: CLINIC | Age: 32
End: 2022-08-08
Payer: COMMERCIAL

## 2022-08-08 VITALS
DIASTOLIC BLOOD PRESSURE: 85 MMHG | HEART RATE: 87 BPM | WEIGHT: 239.9 LBS | TEMPERATURE: 98.2 F | SYSTOLIC BLOOD PRESSURE: 133 MMHG | BODY MASS INDEX: 35.53 KG/M2 | RESPIRATION RATE: 16 BRPM | HEIGHT: 69 IN

## 2022-08-08 DIAGNOSIS — O26.891 RH NEGATIVE STATUS DURING PREGNANCY IN FIRST TRIMESTER: ICD-10-CM

## 2022-08-08 DIAGNOSIS — Z34.83 ENCOUNTER FOR SUPERVISION OF OTHER NORMAL PREGNANCY IN THIRD TRIMESTER: Primary | ICD-10-CM

## 2022-08-08 DIAGNOSIS — Z67.91 RH NEGATIVE STATUS DURING PREGNANCY IN FIRST TRIMESTER: ICD-10-CM

## 2022-08-08 PROCEDURE — 99207 PR PRENATAL VISIT: CPT | Performed by: ADVANCED PRACTICE MIDWIFE

## 2022-08-08 NOTE — NURSING NOTE
"Initial /85 (BP Location: Left arm, Patient Position: Chair, Cuff Size: Adult Regular)   Pulse 87   Temp 98.2  F (36.8  C) (Tympanic)   Resp 16   Ht 1.74 m (5' 8.5\")   Wt 108.8 kg (239 lb 14.4 oz)   LMP 12/06/2021   Breastfeeding No   BMI 35.95 kg/m   Estimated body mass index is 35.95 kg/m  as calculated from the following:    Height as of this encounter: 1.74 m (5' 8.5\").    Weight as of this encounter: 108.8 kg (239 lb 14.4 oz). .    Praveena Gusman CMA    "

## 2022-08-08 NOTE — PROGRESS NOTES
Feeling well.  Baby is active. Denies any leaking of fluid, vaginal bleeding, regular uterine contractions, or headaches or other concerns.  Rh negative.  Rhogam given in June.  Discussed GBS at next appt.    Reviewed to call for contractions, loss of fluid, vaginal bleeding, decreased fetal movement or any other questions or concerns.    RTC in 1 weeks.  Mily Gordon, SANDRA, APRN, CNM

## 2022-08-10 ENCOUNTER — HOSPITAL ENCOUNTER (OUTPATIENT)
Facility: CLINIC | Age: 32
Discharge: HOME | End: 2022-08-10
Attending: OBSTETRICS & GYNECOLOGY | Admitting: OBSTETRICS & GYNECOLOGY
Payer: COMMERCIAL

## 2022-08-10 ENCOUNTER — TELEPHONE (OUTPATIENT)
Dept: OBGYN | Facility: CLINIC | Age: 32
End: 2022-08-10

## 2022-08-10 VITALS — SYSTOLIC BLOOD PRESSURE: 131 MMHG | DIASTOLIC BLOOD PRESSURE: 83 MMHG | RESPIRATION RATE: 18 BRPM | TEMPERATURE: 98.1 F

## 2022-08-10 DIAGNOSIS — O13.3 PIH (PREGNANCY INDUCED HYPERTENSION), THIRD TRIMESTER: Primary | ICD-10-CM

## 2022-08-10 PROBLEM — Z36.89 ENCOUNTER FOR TRIAGE IN PREGNANT PATIENT: Status: ACTIVE | Noted: 2022-08-10

## 2022-08-10 LAB
ALT SERPL W P-5'-P-CCNC: 30 U/L (ref 0–50)
AST SERPL W P-5'-P-CCNC: 18 U/L (ref 0–45)
CREAT UR-MCNC: 25 MG/DL
ERYTHROCYTE [DISTWIDTH] IN BLOOD BY AUTOMATED COUNT: 13.7 % (ref 10–15)
HCT VFR BLD AUTO: 37.2 % (ref 35–47)
HGB BLD-MCNC: 12.3 G/DL (ref 11.7–15.7)
MCH RBC QN AUTO: 30 PG (ref 26.5–33)
MCHC RBC AUTO-ENTMCNC: 33.1 G/DL (ref 31.5–36.5)
MCV RBC AUTO: 91 FL (ref 78–100)
PLATELET # BLD AUTO: 271 10E3/UL (ref 150–450)
PROT UR-MCNC: 0.06 G/L
PROT/CREAT 24H UR: 0.24 G/G CR (ref 0–0.2)
RBC # BLD AUTO: 4.1 10E6/UL (ref 3.8–5.2)
WBC # BLD AUTO: 14.7 10E3/UL (ref 4–11)

## 2022-08-10 PROCEDURE — 84460 ALANINE AMINO (ALT) (SGPT): CPT | Performed by: OBSTETRICS & GYNECOLOGY

## 2022-08-10 PROCEDURE — 84450 TRANSFERASE (AST) (SGOT): CPT | Performed by: OBSTETRICS & GYNECOLOGY

## 2022-08-10 PROCEDURE — G0463 HOSPITAL OUTPT CLINIC VISIT: HCPCS | Mod: 25

## 2022-08-10 PROCEDURE — 84156 ASSAY OF PROTEIN URINE: CPT | Performed by: OBSTETRICS & GYNECOLOGY

## 2022-08-10 PROCEDURE — 85018 HEMOGLOBIN: CPT | Performed by: OBSTETRICS & GYNECOLOGY

## 2022-08-10 PROCEDURE — 36415 COLL VENOUS BLD VENIPUNCTURE: CPT | Performed by: OBSTETRICS & GYNECOLOGY

## 2022-08-10 PROCEDURE — 59025 FETAL NON-STRESS TEST: CPT

## 2022-08-10 PROCEDURE — 999N000105 HC STATISTIC NO DOCUMENTATION TO SUPPORT CHARGE

## 2022-08-10 RX ORDER — LIDOCAINE 40 MG/G
CREAM TOPICAL
Status: DISCONTINUED | OUTPATIENT
Start: 2022-08-10 | End: 2022-08-10 | Stop reason: HOSPADM

## 2022-08-10 RX ORDER — DIPHENHYDRAMINE HCL 25 MG
25 TABLET ORAL EVERY 6 HOURS PRN
Status: ON HOLD | COMMUNITY
End: 2022-08-24

## 2022-08-10 ASSESSMENT — ACTIVITIES OF DAILY LIVING (ADL)
ADLS_ACUITY_SCORE: 31
ADLS_ACUITY_SCORE: 31

## 2022-08-10 NOTE — RESULT ENCOUNTER NOTE
Jocelyn  Your other labs were normal.  Your urine test is to see if you are spilling protein in your urine.  The level is slightly higher than normal.  We will keep an eye on this and the other lab results as your pregnancy progresses.    Dimitri Patten MD

## 2022-08-10 NOTE — PROGRESS NOTES
35+2 [facility] after talking to the clinic RN.  Here for elevated Bp's today x 3.  Headache x 3 days unrelieved by ES Tylenol.  Visual changes in left eye.  Will monitor serial BP's and get labs per Dr Patten.

## 2022-08-10 NOTE — TELEPHONE ENCOUNTER
Pt called 8/10: Patient say she has a headache mainly on left side, she also checked her blood pressure and it was 159 over 104. Patient is worried that her blood pressure is to high. Patient has some concerns and questions.

## 2022-08-10 NOTE — TELEPHONE ENCOUNTER
"Return call to patient.  Spoke with patient on the phone.    S-(situation): headache for the past 3 days  Last appointment 2022 133/85  Headache started Monday.  Last night headache was worse.  Mostly over left eye.  Patient had taken Tylenol for the HA - some relief but not complete resolution  Patient took Benadryl last night wondering if this was more sinus related - seemed liked it help  No HA this morning when she awoke  Now SENA has returned  Patient feels \" off \"  \"My left eye feels like I have something in it but I don't. It feels sleepy,can't focus.\"    Blood pressures taken at work this morning.  159/104  145/104  141/97    No uterine contractions   No bright red bleeding   No leaking of fluid   Baby is active.      B-(background):  at 35w2d  Previous pregnancy was IOL for gestational hypertension    A-(assessment): headache, elevated BP     R-(recommendations): Reassurance. [facility].  Kendal Almeida RN notified.  ETA 20-30 minutes.    Patient in agreement of plan for further evaluation in the [facility] and will go now.    Ayleen Williamson   [facility]  RN      "

## 2022-08-10 NOTE — PROGRESS NOTES
Pt discharged to home.  F/u for Monday in the clinic after a BPP.  Pt reports her headache worsens with Light. Pt reported having an aura in her vision that went away.  Headache felt better after Has a history of migraines.  Encouraged resting and ES Tylenol.  Copy of AVS given.  Pt read and she verbalized understanding.  Ambulated to the door.

## 2022-08-10 NOTE — DISCHARGE INSTRUCTIONS
Discharge Instruction for Undelivered Patients      You were seen for:  Elevated blood pressures and a headache.    We Consulted: Dr Patten  You had (Test or Medicine): NST, labs and SVE     Diet:   Drink 8 to 12 glasses of liquids (milk, juice, water) every day.  You may eat meals and snacks.     Activity:  Count fetal kicks everyday (see handout)  Call your doctor or nurse midwife if your baby is moving less than usual.     Call your provider if you notice:  Swelling in your face or increased swelling in your hands or legs.  Headaches that are not relieved by Tylenol (acetaminophen).  Changes in your vision (blurring: seeing spots or stars.)  Nausea (sick to your stomach) and vomiting (throwing up).   Weight gain of 5 pounds or more per week.  Heartburn that doesn't go away.  Signs of bladder infection: pain when you urinate (use the toilet), need to go more often and more urgently.  The bag of bingham (rupture of membranes) breaks, or you notice leaking in your underwear.  Bright red blood in your underwear.  Abdominal (lower belly) or stomach pain.  For first baby: Contractions (tightening) less than 5 minutes apart for one hour or more.  Second (plus) baby: Contractions (tightening) less than 10 minutes apart and getting stronger.  *If less than 34 weeks: Contractions (tightening) more than 6 times in one hour.  Increase or change in vaginal discharge (note the color and amount)  Other: ***    Follow-up:  As scheduled in the clinic.    Please call and schedule a Biophysical Profile with Ultrasound @ [phone].  Clinic is trying to schedule you to be seen on Monday.

## 2022-08-15 ENCOUNTER — PRENATAL OFFICE VISIT (OUTPATIENT)
Dept: OBGYN | Facility: CLINIC | Age: 32
End: 2022-08-15
Payer: COMMERCIAL

## 2022-08-15 ENCOUNTER — HOSPITAL ENCOUNTER (OUTPATIENT)
Dept: ULTRASOUND IMAGING | Facility: CLINIC | Age: 32
Discharge: HOME | End: 2022-08-15
Attending: OBSTETRICS & GYNECOLOGY | Admitting: OBSTETRICS & GYNECOLOGY
Payer: COMMERCIAL

## 2022-08-15 VITALS
BODY MASS INDEX: 35.96 KG/M2 | DIASTOLIC BLOOD PRESSURE: 87 MMHG | SYSTOLIC BLOOD PRESSURE: 128 MMHG | WEIGHT: 240 LBS | HEART RATE: 90 BPM | TEMPERATURE: 98.9 F

## 2022-08-15 DIAGNOSIS — O13.3 PIH (PREGNANCY INDUCED HYPERTENSION), THIRD TRIMESTER: ICD-10-CM

## 2022-08-15 DIAGNOSIS — Z34.83 ENCOUNTER FOR SUPERVISION OF OTHER NORMAL PREGNANCY IN THIRD TRIMESTER: Primary | ICD-10-CM

## 2022-08-15 PROCEDURE — 76819 FETAL BIOPHYS PROFIL W/O NST: CPT

## 2022-08-15 PROCEDURE — 87653 STREP B DNA AMP PROBE: CPT | Performed by: OBSTETRICS & GYNECOLOGY

## 2022-08-15 PROCEDURE — 99207 PR PRENATAL VISIT: CPT | Performed by: OBSTETRICS & GYNECOLOGY

## 2022-08-15 NOTE — PROGRESS NOTES
CC: Here for routine prenatal visit @ 36w0d   HPI:  Feeling well, no complaints.      PE:/87 (BP Location: Right arm, Patient Position: Chair, Cuff Size: Adult Large)   Pulse 90   Temp 98.9  F (37.2  C) (Tympanic)   Wt 108.9 kg (240 lb)   LMP 12/06/2021   Breastfeeding No   BMI 35.96 kg/m        FH 35  Doptones deferred, BPP 8/8 prior to clinic  SVE declines, plans to do Fri     A:  1. Prenatal care, subsequent pregnancy in first trimester   s/p rhogam, tdap  Elevated GCT, passed GTT  S/p covid vaccine  NIPT WNL  GBS today  Elevated BP last week, WNL today, HELLP labs WNL at that time, UPC mildly elevated but not to preE levels, will plan for repeat visit later this week        Louann Todd MD   8/15/2022 1:43 PM

## 2022-08-15 NOTE — NURSING NOTE
"Initial /87 (BP Location: Right arm, Patient Position: Chair, Cuff Size: Adult Large)   Pulse 90   Temp 98.9  F (37.2  C) (Tympanic)   Wt 108.9 kg (240 lb)   LMP 12/06/2021   Breastfeeding No   BMI 35.96 kg/m   Estimated body mass index is 35.96 kg/m  as calculated from the following:    Height as of 8/8/22: 1.74 m (5' 8.5\").    Weight as of this encounter: 108.9 kg (240 lb). .    Mishel Cooper MA    "

## 2022-08-16 LAB — GP B STREP DNA SPEC QL NAA+PROBE: NEGATIVE

## 2022-08-18 PROBLEM — Z36.89 ENCOUNTER FOR TRIAGE IN PREGNANT PATIENT: Status: RESOLVED | Noted: 2022-08-10 | Resolved: 2022-08-18

## 2022-08-18 NOTE — PROGRESS NOTES
"[facility]    Return OB Note    CC: Return OB     Subjective:  Jocelyn is a 32 year old  at 36w4d   Denies vaginal bleeding, loss of fluid, or regular contractions. Good fetal movement.  Complaints today: None    Objective:  BP (!) 137/94 (BP Location: Right arm, Patient Position: Chair, Cuff Size: Adult Regular)   Pulse 117   Temp 98.2  F (36.8  C) (Tympanic)   Resp 16   Ht 1.74 m (5' 8.5\")   Wt 109.6 kg (241 lb 9.6 oz)   LMP 2021   Breastfeeding No   BMI 36.20 kg/m      Fundal height: 37cm  FHT: 150bpm  SVE:     Assessment/Plan:   Encounter Diagnoses   Name Primary?     Prenatal care, subsequent pregnancy in third trimester Yes     Gestational hypertension, third trimester        IUP at 36w4d  -Gestational HTN: Elevated BP when in triage and now today. Will recheck HELLP labs. Discussed recommendation for IOL at 37 weeks. Patient is hesitant as her prior 38 week delivery required NICU care and she does not want that again. Discussed rationale behind recommendations and risks of continued pregnancy including progression to preeclampsia, eclampsia. Discussed risks of seizure, stroke, stillbirth. Declines IOL at this time, will consider at 38 weeks. Twice weekly BPPs ordered. Follow up in office next week.   -Rh negative: s/p Rhogam 22  -Failed 1 hr GCT, passed 3 hr GTT  -GBS negative  -Strict return precautions given    RTC 1 weeks    Renetta Vargas DO    "

## 2022-08-19 ENCOUNTER — PRENATAL OFFICE VISIT (OUTPATIENT)
Dept: OBGYN | Facility: CLINIC | Age: 32
End: 2022-08-19
Payer: COMMERCIAL

## 2022-08-19 VITALS
TEMPERATURE: 98.2 F | HEIGHT: 69 IN | HEART RATE: 117 BPM | WEIGHT: 241.6 LBS | RESPIRATION RATE: 16 BRPM | SYSTOLIC BLOOD PRESSURE: 137 MMHG | BODY MASS INDEX: 35.78 KG/M2 | DIASTOLIC BLOOD PRESSURE: 94 MMHG

## 2022-08-19 DIAGNOSIS — Z34.83 PRENATAL CARE, SUBSEQUENT PREGNANCY IN THIRD TRIMESTER: Primary | ICD-10-CM

## 2022-08-19 DIAGNOSIS — O13.3 GESTATIONAL HYPERTENSION, THIRD TRIMESTER: ICD-10-CM

## 2022-08-19 LAB
ALT SERPL W P-5'-P-CCNC: 21 U/L (ref 0–50)
AST SERPL W P-5'-P-CCNC: 12 U/L (ref 0–45)
CREAT SERPL-MCNC: 0.61 MG/DL (ref 0.52–1.04)
CREAT UR-MCNC: 226 MG/DL
ERYTHROCYTE [DISTWIDTH] IN BLOOD BY AUTOMATED COUNT: 13.5 % (ref 10–15)
GFR SERPL CREATININE-BSD FRML MDRD: >90 ML/MIN/1.73M2
HCT VFR BLD AUTO: 37.2 % (ref 35–47)
HGB BLD-MCNC: 12.5 G/DL (ref 11.7–15.7)
MCH RBC QN AUTO: 31 PG (ref 26.5–33)
MCHC RBC AUTO-ENTMCNC: 33.6 G/DL (ref 31.5–36.5)
MCV RBC AUTO: 92 FL (ref 78–100)
PLATELET # BLD AUTO: 274 10E3/UL (ref 150–450)
PROT UR-MCNC: 0.12 G/L
PROT/CREAT 24H UR: 0.05 G/G CR (ref 0–0.2)
RBC # BLD AUTO: 4.03 10E6/UL (ref 3.8–5.2)
WBC # BLD AUTO: 16.4 10E3/UL (ref 4–11)

## 2022-08-19 PROCEDURE — 99207 PR PRENATAL VISIT: CPT | Performed by: OBSTETRICS & GYNECOLOGY

## 2022-08-19 PROCEDURE — 36415 COLL VENOUS BLD VENIPUNCTURE: CPT | Performed by: OBSTETRICS & GYNECOLOGY

## 2022-08-19 PROCEDURE — 85027 COMPLETE CBC AUTOMATED: CPT | Performed by: OBSTETRICS & GYNECOLOGY

## 2022-08-19 PROCEDURE — 84156 ASSAY OF PROTEIN URINE: CPT | Performed by: OBSTETRICS & GYNECOLOGY

## 2022-08-19 PROCEDURE — 84450 TRANSFERASE (AST) (SGOT): CPT | Performed by: OBSTETRICS & GYNECOLOGY

## 2022-08-19 PROCEDURE — 84460 ALANINE AMINO (ALT) (SGPT): CPT | Performed by: OBSTETRICS & GYNECOLOGY

## 2022-08-19 PROCEDURE — 82565 ASSAY OF CREATININE: CPT | Performed by: OBSTETRICS & GYNECOLOGY

## 2022-08-19 NOTE — NURSING NOTE
"Initial BP (!) 137/94 (BP Location: Right arm, Patient Position: Chair, Cuff Size: Adult Regular)   Pulse 117   Temp 98.2  F (36.8  C) (Tympanic)   Resp 16   Ht 1.74 m (5' 8.5\")   Wt 109.6 kg (241 lb 9.6 oz)   LMP 12/06/2021   Breastfeeding No   BMI 36.20 kg/m   Estimated body mass index is 36.2 kg/m  as calculated from the following:    Height as of this encounter: 1.74 m (5' 8.5\").    Weight as of this encounter: 109.6 kg (241 lb 9.6 oz). .    Praveena Gusman, [facility]    "

## 2022-08-22 ENCOUNTER — HOSPITAL ENCOUNTER (OUTPATIENT)
Dept: ULTRASOUND IMAGING | Facility: CLINIC | Age: 32
Discharge: HOME | End: 2022-08-22
Attending: OBSTETRICS & GYNECOLOGY | Admitting: OBSTETRICS & GYNECOLOGY
Payer: COMMERCIAL

## 2022-08-22 DIAGNOSIS — O13.3 GESTATIONAL HYPERTENSION, THIRD TRIMESTER: ICD-10-CM

## 2022-08-22 PROCEDURE — 76819 FETAL BIOPHYS PROFIL W/O NST: CPT

## 2022-08-24 ENCOUNTER — HOSPITAL ENCOUNTER (INPATIENT)
Facility: CLINIC | Age: 32
LOS: 2 days | Discharge: ANOTHER HOSPITAL | End: 2022-08-26
Attending: OBSTETRICS & GYNECOLOGY | Admitting: OBSTETRICS & GYNECOLOGY
Payer: COMMERCIAL

## 2022-08-24 ENCOUNTER — ANESTHESIA (OUTPATIENT)
Dept: OBGYN | Facility: CLINIC | Age: 32
End: 2022-08-24
Payer: COMMERCIAL

## 2022-08-24 ENCOUNTER — ANESTHESIA EVENT (OUTPATIENT)
Dept: OBGYN | Facility: CLINIC | Age: 32
End: 2022-08-24
Payer: COMMERCIAL

## 2022-08-24 ENCOUNTER — PRENATAL OFFICE VISIT (OUTPATIENT)
Dept: OBGYN | Facility: CLINIC | Age: 32
End: 2022-08-24
Payer: COMMERCIAL

## 2022-08-24 VITALS
WEIGHT: 243.4 LBS | TEMPERATURE: 98.3 F | HEART RATE: 116 BPM | RESPIRATION RATE: 16 BRPM | HEIGHT: 69 IN | BODY MASS INDEX: 36.05 KG/M2 | SYSTOLIC BLOOD PRESSURE: 148 MMHG | DIASTOLIC BLOOD PRESSURE: 99 MMHG

## 2022-08-24 DIAGNOSIS — Z34.80 PRENATAL CARE OF MULTIGRAVIDA, ANTEPARTUM: Primary | ICD-10-CM

## 2022-08-24 DIAGNOSIS — O13.3 GESTATIONAL HYPERTENSION, THIRD TRIMESTER: ICD-10-CM

## 2022-08-24 LAB
ABO/RH(D): NORMAL
ALT SERPL W P-5'-P-CCNC: 20 U/L (ref 0–50)
ANTIBODY SCREEN: NEGATIVE
AST SERPL W P-5'-P-CCNC: 10 U/L (ref 0–45)
BASOPHILS # BLD AUTO: 0.1 10E3/UL (ref 0–0.2)
BASOPHILS NFR BLD AUTO: 0 %
CREAT SERPL-MCNC: 0.51 MG/DL (ref 0.52–1.04)
CREAT UR-MCNC: 124 MG/DL
EOSINOPHIL # BLD AUTO: 0.2 10E3/UL (ref 0–0.7)
EOSINOPHIL NFR BLD AUTO: 1 %
ERYTHROCYTE [DISTWIDTH] IN BLOOD BY AUTOMATED COUNT: 13.4 % (ref 10–15)
GFR SERPL CREATININE-BSD FRML MDRD: >90 ML/MIN/1.73M2
HCT VFR BLD AUTO: 35.2 % (ref 35–47)
HGB BLD-MCNC: 12.1 G/DL (ref 11.7–15.7)
HOLD SPECIMEN: NORMAL
HOLD SPECIMEN: NORMAL
IMM GRANULOCYTES # BLD: 0.2 10E3/UL
IMM GRANULOCYTES NFR BLD: 1 %
LYMPHOCYTES # BLD AUTO: 2.2 10E3/UL (ref 0.8–5.3)
LYMPHOCYTES NFR BLD AUTO: 14 %
MCH RBC QN AUTO: 30.6 PG (ref 26.5–33)
MCHC RBC AUTO-ENTMCNC: 34.4 G/DL (ref 31.5–36.5)
MCV RBC AUTO: 89 FL (ref 78–100)
MONOCYTES # BLD AUTO: 0.9 10E3/UL (ref 0–1.3)
MONOCYTES NFR BLD AUTO: 6 %
NEUTROPHILS # BLD AUTO: 12.6 10E3/UL (ref 1.6–8.3)
NEUTROPHILS NFR BLD AUTO: 78 %
NRBC # BLD AUTO: 0 10E3/UL
NRBC BLD AUTO-RTO: 0 /100
PLATELET # BLD AUTO: 302 10E3/UL (ref 150–450)
PROT UR-MCNC: 0.13 G/L
PROT/CREAT 24H UR: 0.1 G/G CR (ref 0–0.2)
RBC # BLD AUTO: 3.95 10E6/UL (ref 3.8–5.2)
SARS-COV-2 RNA RESP QL NAA+PROBE: NEGATIVE
SPECIMEN EXPIRATION DATE: NORMAL
WBC # BLD AUTO: 16.2 10E3/UL (ref 4–11)

## 2022-08-24 PROCEDURE — 370N000003 HC ANESTHESIA WARD SERVICE

## 2022-08-24 PROCEDURE — 120N000001 HC R&B MED SURG/OB

## 2022-08-24 PROCEDURE — 85004 AUTOMATED DIFF WBC COUNT: CPT | Performed by: OBSTETRICS & GYNECOLOGY

## 2022-08-24 PROCEDURE — 84450 TRANSFERASE (AST) (SGOT): CPT | Performed by: OBSTETRICS & GYNECOLOGY

## 2022-08-24 PROCEDURE — 82565 ASSAY OF CREATININE: CPT | Performed by: OBSTETRICS & GYNECOLOGY

## 2022-08-24 PROCEDURE — 84156 ASSAY OF PROTEIN URINE: CPT | Performed by: OBSTETRICS & GYNECOLOGY

## 2022-08-24 PROCEDURE — 99207 PR PRENATAL VISIT: CPT | Performed by: OBSTETRICS & GYNECOLOGY

## 2022-08-24 PROCEDURE — 86850 RBC ANTIBODY SCREEN: CPT | Performed by: OBSTETRICS & GYNECOLOGY

## 2022-08-24 PROCEDURE — 86780 TREPONEMA PALLIDUM: CPT | Performed by: OBSTETRICS & GYNECOLOGY

## 2022-08-24 PROCEDURE — 250N000013 HC RX MED GY IP 250 OP 250 PS 637: Performed by: OBSTETRICS & GYNECOLOGY

## 2022-08-24 PROCEDURE — 87635 SARS-COV-2 COVID-19 AMP PRB: CPT | Performed by: OBSTETRICS & GYNECOLOGY

## 2022-08-24 PROCEDURE — 86901 BLOOD TYPING SEROLOGIC RH(D): CPT | Performed by: OBSTETRICS & GYNECOLOGY

## 2022-08-24 PROCEDURE — 84460 ALANINE AMINO (ALT) (SGPT): CPT | Performed by: OBSTETRICS & GYNECOLOGY

## 2022-08-24 RX ORDER — OXYTOCIN 10 [USP'U]/ML
10 INJECTION, SOLUTION INTRAMUSCULAR; INTRAVENOUS
Status: CANCELLED | COMMUNITY
Start: 2022-08-24

## 2022-08-24 RX ORDER — CITRIC ACID/SODIUM CITRATE 334-500MG
30 SOLUTION, ORAL ORAL
Status: DISCONTINUED | COMMUNITY
Start: 2022-08-24 | End: 2022-08-26

## 2022-08-24 RX ORDER — KETOROLAC TROMETHAMINE 30 MG/ML
30 INJECTION, SOLUTION INTRAMUSCULAR; INTRAVENOUS
Status: CANCELLED | COMMUNITY
Start: 2022-08-24 | End: 2022-08-29

## 2022-08-24 RX ORDER — OXYTOCIN 10 [USP'U]/ML
10 INJECTION, SOLUTION INTRAMUSCULAR; INTRAVENOUS
Status: DISCONTINUED | COMMUNITY
Start: 2022-08-24 | End: 2022-08-26

## 2022-08-24 RX ORDER — KETOROLAC TROMETHAMINE 30 MG/ML
30 INJECTION, SOLUTION INTRAMUSCULAR; INTRAVENOUS
Status: DISCONTINUED | COMMUNITY
Start: 2022-08-24 | End: 2022-08-26

## 2022-08-24 RX ORDER — TERBUTALINE SULFATE 1 MG/ML
0.25 INJECTION, SOLUTION SUBCUTANEOUS
Status: CANCELLED | COMMUNITY
Start: 2022-08-24

## 2022-08-24 RX ORDER — LABETALOL HYDROCHLORIDE 5 MG/ML
20-80 INJECTION, SOLUTION INTRAVENOUS EVERY 10 MIN PRN
Status: CANCELLED | COMMUNITY
Start: 2022-08-24

## 2022-08-24 RX ORDER — ACETAMINOPHEN 325 MG/1
650 TABLET ORAL EVERY 4 HOURS PRN
Status: CANCELLED | COMMUNITY
Start: 2022-08-24

## 2022-08-24 RX ORDER — LABETALOL HYDROCHLORIDE 5 MG/ML
20-80 INJECTION, SOLUTION INTRAVENOUS EVERY 10 MIN PRN
Status: DISCONTINUED | COMMUNITY
Start: 2022-08-24 | End: 2022-08-26

## 2022-08-24 RX ORDER — LIDOCAINE 40 MG/G
CREAM TOPICAL
Status: CANCELLED | COMMUNITY
Start: 2022-08-24

## 2022-08-24 RX ORDER — SODIUM CHLORIDE, SODIUM LACTATE, POTASSIUM CHLORIDE, CALCIUM CHLORIDE 600; 310; 30; 20 MG/100ML; MG/100ML; MG/100ML; MG/100ML
INJECTION, SOLUTION INTRAVENOUS CONTINUOUS
Status: DISCONTINUED | COMMUNITY
Start: 2022-08-24 | End: 2022-08-26

## 2022-08-24 RX ORDER — LORAZEPAM 2 MG/ML
2 INJECTION INTRAMUSCULAR
Status: CANCELLED | COMMUNITY
Start: 2022-08-24

## 2022-08-24 RX ORDER — SODIUM CHLORIDE, SODIUM LACTATE, POTASSIUM CHLORIDE, CALCIUM CHLORIDE 600; 310; 30; 20 MG/100ML; MG/100ML; MG/100ML; MG/100ML
10-125 INJECTION, SOLUTION INTRAVENOUS CONTINUOUS
Status: CANCELLED | COMMUNITY
Start: 2022-08-24

## 2022-08-24 RX ORDER — MISOPROSTOL 200 UG/1
800 TABLET ORAL
Status: CANCELLED | COMMUNITY
Start: 2022-08-24

## 2022-08-24 RX ORDER — OXYTOCIN/0.9 % SODIUM CHLORIDE 30/500 ML
100-340 PLASTIC BAG, INJECTION (ML) INTRAVENOUS CONTINUOUS PRN
Status: DISCONTINUED | COMMUNITY
Start: 2022-08-24 | End: 2022-08-26

## 2022-08-24 RX ORDER — ONDANSETRON 4 MG/1
4 TABLET, ORALLY DISINTEGRATING ORAL EVERY 6 HOURS PRN
Status: CANCELLED | COMMUNITY
Start: 2022-08-24

## 2022-08-24 RX ORDER — TRANEXAMIC ACID 10 MG/ML
1 INJECTION, SOLUTION INTRAVENOUS EVERY 30 MIN PRN
Status: CANCELLED | COMMUNITY
Start: 2022-08-24

## 2022-08-24 RX ORDER — MORPHINE SULFATE 10 MG/ML
10 INJECTION, SOLUTION INTRAMUSCULAR; INTRAVENOUS
Status: CANCELLED | OUTPATIENT
Start: 2022-08-24

## 2022-08-24 RX ORDER — CITRIC ACID/SODIUM CITRATE 334-500MG
30 SOLUTION, ORAL ORAL
Status: CANCELLED | COMMUNITY
Start: 2022-08-24

## 2022-08-24 RX ORDER — MAGNESIUM SULFATE HEPTAHYDRATE 40 MG/ML
4 INJECTION, SOLUTION INTRAVENOUS
Status: DISCONTINUED | COMMUNITY
Start: 2022-08-24 | End: 2022-08-26

## 2022-08-24 RX ORDER — OXYTOCIN/0.9 % SODIUM CHLORIDE 30/500 ML
100-340 PLASTIC BAG, INJECTION (ML) INTRAVENOUS CONTINUOUS PRN
Status: CANCELLED | COMMUNITY
Start: 2022-08-24

## 2022-08-24 RX ORDER — TRANEXAMIC ACID 10 MG/ML
1 INJECTION, SOLUTION INTRAVENOUS EVERY 30 MIN PRN
Status: DISCONTINUED | COMMUNITY
Start: 2022-08-24 | End: 2022-08-26

## 2022-08-24 RX ORDER — HYDRALAZINE HYDROCHLORIDE 20 MG/ML
10 INJECTION INTRAMUSCULAR; INTRAVENOUS
Status: CANCELLED | COMMUNITY
Start: 2022-08-24

## 2022-08-24 RX ORDER — LORAZEPAM 2 MG/ML
2 INJECTION INTRAMUSCULAR
Status: DISCONTINUED | COMMUNITY
Start: 2022-08-24 | End: 2022-08-26

## 2022-08-24 RX ORDER — MISOPROSTOL 100 UG/1
25 TABLET ORAL
Status: COMPLETED | COMMUNITY
Start: 2022-08-24 | End: 2022-08-25

## 2022-08-24 RX ORDER — MAGNESIUM SULFATE HEPTAHYDRATE 40 MG/ML
4 INJECTION, SOLUTION INTRAVENOUS
Status: CANCELLED | COMMUNITY
Start: 2022-08-24

## 2022-08-24 RX ORDER — METOCLOPRAMIDE 10 MG/1
10 TABLET ORAL EVERY 6 HOURS PRN
Status: DISCONTINUED | COMMUNITY
Start: 2022-08-24 | End: 2022-08-26

## 2022-08-24 RX ORDER — MAGNESIUM SULFATE HEPTAHYDRATE 500 MG/ML
10 INJECTION, SOLUTION INTRAMUSCULAR; INTRAVENOUS
Status: CANCELLED | COMMUNITY
Start: 2022-08-24

## 2022-08-24 RX ORDER — TERBUTALINE SULFATE 1 MG/ML
0.25 INJECTION, SOLUTION SUBCUTANEOUS
Status: DISCONTINUED | COMMUNITY
Start: 2022-08-24 | End: 2022-08-26

## 2022-08-24 RX ORDER — MISOPROSTOL 100 UG/1
25 TABLET ORAL
Status: CANCELLED | COMMUNITY
Start: 2022-08-24

## 2022-08-24 RX ORDER — LIDOCAINE 40 MG/G
CREAM TOPICAL
Status: DISCONTINUED | COMMUNITY
Start: 2022-08-24 | End: 2022-08-26

## 2022-08-24 RX ORDER — MORPHINE SULFATE 10 MG/ML
10 INJECTION, SOLUTION INTRAMUSCULAR; INTRAVENOUS
Status: DISCONTINUED | OUTPATIENT
Start: 2022-08-24 | End: 2022-08-26

## 2022-08-24 RX ORDER — CARBOPROST TROMETHAMINE 250 UG/ML
250 INJECTION, SOLUTION INTRAMUSCULAR
Status: DISCONTINUED | COMMUNITY
Start: 2022-08-24 | End: 2022-08-26

## 2022-08-24 RX ORDER — METOCLOPRAMIDE HYDROCHLORIDE 5 MG/ML
10 INJECTION INTRAMUSCULAR; INTRAVENOUS EVERY 6 HOURS PRN
Status: CANCELLED | COMMUNITY
Start: 2022-08-24

## 2022-08-24 RX ORDER — IBUPROFEN 800 MG/1
800 TABLET, FILM COATED ORAL
Status: DISCONTINUED | COMMUNITY
Start: 2022-08-24 | End: 2022-08-26

## 2022-08-24 RX ORDER — METHYLERGONOVINE MALEATE 0.2 MG/ML
200 INJECTION INTRAVENOUS
Status: DISCONTINUED | COMMUNITY
Start: 2022-08-24 | End: 2022-08-26

## 2022-08-24 RX ORDER — METOCLOPRAMIDE 10 MG/1
10 TABLET ORAL EVERY 6 HOURS PRN
Status: CANCELLED | COMMUNITY
Start: 2022-08-24

## 2022-08-24 RX ORDER — HYDROXYZINE HYDROCHLORIDE 50 MG/1
50 TABLET, FILM COATED ORAL
Status: DISCONTINUED | COMMUNITY
Start: 2022-08-24 | End: 2022-08-26

## 2022-08-24 RX ORDER — SODIUM CHLORIDE, SODIUM LACTATE, POTASSIUM CHLORIDE, CALCIUM CHLORIDE 600; 310; 30; 20 MG/100ML; MG/100ML; MG/100ML; MG/100ML
10-125 INJECTION, SOLUTION INTRAVENOUS CONTINUOUS
Status: DISCONTINUED | COMMUNITY
Start: 2022-08-24 | End: 2022-08-26

## 2022-08-24 RX ORDER — MISOPROSTOL 200 UG/1
400 TABLET ORAL
Status: DISCONTINUED | COMMUNITY
Start: 2022-08-24 | End: 2022-08-26

## 2022-08-24 RX ORDER — OXYTOCIN/0.9 % SODIUM CHLORIDE 30/500 ML
340 PLASTIC BAG, INJECTION (ML) INTRAVENOUS CONTINUOUS PRN
Status: DISCONTINUED | COMMUNITY
Start: 2022-08-24 | End: 2022-08-26

## 2022-08-24 RX ORDER — IBUPROFEN 200 MG
800 TABLET ORAL
Status: CANCELLED | COMMUNITY
Start: 2022-08-24 | End: 2022-08-29

## 2022-08-24 RX ORDER — MAGNESIUM SULFATE HEPTAHYDRATE 40 MG/ML
2 INJECTION, SOLUTION INTRAVENOUS
Status: DISCONTINUED | COMMUNITY
Start: 2022-08-24 | End: 2022-08-26

## 2022-08-24 RX ORDER — METHYLERGONOVINE MALEATE 0.2 MG/ML
200 INJECTION INTRAVENOUS
Status: CANCELLED | COMMUNITY
Start: 2022-08-24

## 2022-08-24 RX ORDER — ACETAMINOPHEN 325 MG/1
650 TABLET ORAL EVERY 4 HOURS PRN
Status: DISCONTINUED | COMMUNITY
Start: 2022-08-24 | End: 2022-08-26

## 2022-08-24 RX ORDER — MAGNESIUM SULFATE HEPTAHYDRATE 500 MG/ML
10 INJECTION, SOLUTION INTRAMUSCULAR; INTRAVENOUS
Status: DISCONTINUED | COMMUNITY
Start: 2022-08-24 | End: 2022-08-26

## 2022-08-24 RX ORDER — CARBOPROST TROMETHAMINE 250 UG/ML
250 INJECTION, SOLUTION INTRAMUSCULAR
Status: CANCELLED | COMMUNITY
Start: 2022-08-24

## 2022-08-24 RX ORDER — MISOPROSTOL 200 UG/1
400 TABLET ORAL
Status: CANCELLED | COMMUNITY
Start: 2022-08-24

## 2022-08-24 RX ORDER — ONDANSETRON 4 MG/1
4 TABLET, ORALLY DISINTEGRATING ORAL EVERY 6 HOURS PRN
Status: DISCONTINUED | COMMUNITY
Start: 2022-08-24 | End: 2022-08-26

## 2022-08-24 RX ORDER — HYDROXYZINE HYDROCHLORIDE 50 MG/1
50 TABLET, FILM COATED ORAL
Status: CANCELLED | COMMUNITY
Start: 2022-08-24

## 2022-08-24 RX ORDER — HYDRALAZINE HYDROCHLORIDE 20 MG/ML
10 INJECTION INTRAMUSCULAR; INTRAVENOUS
Status: DISCONTINUED | COMMUNITY
Start: 2022-08-24 | End: 2022-08-26

## 2022-08-24 RX ORDER — MAGNESIUM SULFATE HEPTAHYDRATE 40 MG/ML
2 INJECTION, SOLUTION INTRAVENOUS
Status: CANCELLED | COMMUNITY
Start: 2022-08-24

## 2022-08-24 RX ORDER — MISOPROSTOL 200 UG/1
800 TABLET ORAL
Status: DISCONTINUED | COMMUNITY
Start: 2022-08-24 | End: 2022-08-26

## 2022-08-24 RX ORDER — ONDANSETRON 2 MG/ML
4 INJECTION INTRAMUSCULAR; INTRAVENOUS EVERY 6 HOURS PRN
Status: CANCELLED | COMMUNITY
Start: 2022-08-24

## 2022-08-24 RX ORDER — METOCLOPRAMIDE HYDROCHLORIDE 5 MG/ML
10 INJECTION INTRAMUSCULAR; INTRAVENOUS EVERY 6 HOURS PRN
Status: DISCONTINUED | COMMUNITY
Start: 2022-08-24 | End: 2022-08-26

## 2022-08-24 RX ORDER — ONDANSETRON 2 MG/ML
4 INJECTION INTRAMUSCULAR; INTRAVENOUS EVERY 6 HOURS PRN
Status: DISCONTINUED | COMMUNITY
Start: 2022-08-24 | End: 2022-08-26

## 2022-08-24 RX ORDER — OXYTOCIN/0.9 % SODIUM CHLORIDE 30/500 ML
340 PLASTIC BAG, INJECTION (ML) INTRAVENOUS CONTINUOUS PRN
Status: CANCELLED | COMMUNITY
Start: 2022-08-24

## 2022-08-24 RX ORDER — SODIUM CHLORIDE, SODIUM LACTATE, POTASSIUM CHLORIDE, CALCIUM CHLORIDE 600; 310; 30; 20 MG/100ML; MG/100ML; MG/100ML; MG/100ML
INJECTION, SOLUTION INTRAVENOUS CONTINUOUS
Status: CANCELLED | COMMUNITY
Start: 2022-08-24

## 2022-08-24 RX ADMIN — MISOPROSTOL 25 MCG: 100 TABLET ORAL at 23:40

## 2022-08-24 RX ADMIN — MISOPROSTOL 25 MCG: 100 TABLET ORAL at 21:40

## 2022-08-24 ASSESSMENT — ACTIVITIES OF DAILY LIVING (ADL)
CHANGE_IN_FUNCTIONAL_STATUS_SINCE_ONSET_OF_CURRENT_ILLNESS/INJURY: NO
WEAR_GLASSES_OR_BLIND: NO
DRESSING/BATHING_DIFFICULTY: NO
ADLS_ACUITY_SCORE: 18
FALL_HISTORY_WITHIN_LAST_SIX_MONTHS: NO
CONCENTRATING,_REMEMBERING_OR_MAKING_DECISIONS_DIFFICULTY: NO
DOING_ERRANDS_INDEPENDENTLY_DIFFICULTY: NO
HEARING_DIFFICULTY_OR_DEAF: NO
WALKING_OR_CLIMBING_STAIRS_DIFFICULTY: NO
DIFFICULTY_COMMUNICATING: NO
DIFFICULTY_EATING/SWALLOWING: NO
TOILETING_ISSUES: NO
ADLS_ACUITY_SCORE: 18

## 2022-08-24 NOTE — PROGRESS NOTES
Patient presents for routine prenatal visit at 37w2d.  Patient without complaint.   PE: See OB vitals  Body mass index is 36.47 kg/m .  No vaginal bleeding, LOF, contractions.  No HA, RUQ pain, N/V, visual changes.  Discussed indications, risks, complications and failure rate of inductions.  Given that she is now >37 weeks with elevated BPs, we discussed the recommendation to induce labor.  She had this happen last pregnancy and is aware of what an induction entails.  Discussed with Dr. Vargas and she agrees.  The patient will be sent to L&D for MIOL.             Questions asked and answered.      Douglas Parker MD FACOG

## 2022-08-24 NOTE — NURSING NOTE
"Initial BP (!) 148/99 (BP Location: Left arm, Patient Position: Chair, Cuff Size: Adult Regular)   Pulse 116   Temp 98.3  F (36.8  C) (Tympanic)   Resp 16   Ht 1.74 m (5' 8.5\")   Wt 110.4 kg (243 lb 6.4 oz)   LMP 12/06/2021   Breastfeeding No   BMI 36.47 kg/m   Estimated body mass index is 36.47 kg/m  as calculated from the following:    Height as of this encounter: 1.74 m (5' 8.5\").    Weight as of this encounter: 110.4 kg (243 lb 6.4 oz). .    Praveena Gusman, MARCIO    "

## 2022-08-25 LAB — T PALLIDUM AB SER QL: NONREACTIVE

## 2022-08-25 PROCEDURE — 120N000001 HC R&B MED SURG/OB

## 2022-08-25 PROCEDURE — 258N000003 HC RX IP 258 OP 636: Performed by: OBSTETRICS & GYNECOLOGY

## 2022-08-25 PROCEDURE — 99221 1ST HOSP IP/OBS SF/LOW 40: CPT | Performed by: OBSTETRICS & GYNECOLOGY

## 2022-08-25 PROCEDURE — 250N000009 HC RX 250: Performed by: OBSTETRICS & GYNECOLOGY

## 2022-08-25 PROCEDURE — 3E033VJ INTRODUCTION OF OTHER HORMONE INTO PERIPHERAL VEIN, PERCUTANEOUS APPROACH: ICD-10-PCS | Performed by: OBSTETRICS & GYNECOLOGY

## 2022-08-25 PROCEDURE — 250N000013 HC RX MED GY IP 250 OP 250 PS 637: Performed by: OBSTETRICS & GYNECOLOGY

## 2022-08-25 RX ORDER — LIDOCAINE 40 MG/G
CREAM TOPICAL
Status: DISCONTINUED | OUTPATIENT
Start: 2022-08-25 | End: 2022-08-26 | Stop reason: HOSPADM

## 2022-08-25 RX ORDER — TERBUTALINE SULFATE 1 MG/ML
0.25 INJECTION, SOLUTION SUBCUTANEOUS
Status: DISCONTINUED | OUTPATIENT
Start: 2022-08-25 | End: 2022-08-26 | Stop reason: HOSPADM

## 2022-08-25 RX ORDER — SODIUM CHLORIDE, SODIUM LACTATE, POTASSIUM CHLORIDE, CALCIUM CHLORIDE 600; 310; 30; 20 MG/100ML; MG/100ML; MG/100ML; MG/100ML
INJECTION, SOLUTION INTRAVENOUS CONTINUOUS PRN
Status: DISCONTINUED | OUTPATIENT
Start: 2022-08-25 | End: 2022-08-26 | Stop reason: HOSPADM

## 2022-08-25 RX ORDER — ACETAMINOPHEN 500 MG
1000 TABLET ORAL EVERY 4 HOURS PRN
Status: DISCONTINUED | OUTPATIENT
Start: 2022-08-25 | End: 2022-08-26

## 2022-08-25 RX ORDER — OXYTOCIN/0.9 % SODIUM CHLORIDE 30/500 ML
1-24 PLASTIC BAG, INJECTION (ML) INTRAVENOUS CONTINUOUS
Status: DISCONTINUED | OUTPATIENT
Start: 2022-08-25 | End: 2022-08-26 | Stop reason: HOSPADM

## 2022-08-25 RX ADMIN — MISOPROSTOL 25 MCG: 100 TABLET ORAL at 18:05

## 2022-08-25 RX ADMIN — MISOPROSTOL 25 MCG: 100 TABLET ORAL at 08:11

## 2022-08-25 RX ADMIN — MISOPROSTOL 25 MCG: 100 TABLET ORAL at 11:59

## 2022-08-25 RX ADMIN — MISOPROSTOL 25 MCG: 100 TABLET ORAL at 01:43

## 2022-08-25 RX ADMIN — MISOPROSTOL 25 MCG: 100 TABLET ORAL at 05:39

## 2022-08-25 RX ADMIN — MISOPROSTOL 25 MCG: 100 TABLET ORAL at 03:38

## 2022-08-25 RX ADMIN — MISOPROSTOL 25 MCG: 100 TABLET ORAL at 16:09

## 2022-08-25 RX ADMIN — MISOPROSTOL 25 MCG: 100 TABLET ORAL at 20:25

## 2022-08-25 RX ADMIN — SODIUM CHLORIDE, POTASSIUM CHLORIDE, SODIUM LACTATE AND CALCIUM CHLORIDE: 600; 310; 30; 20 INJECTION, SOLUTION INTRAVENOUS at 23:24

## 2022-08-25 RX ADMIN — MISOPROSTOL 25 MCG: 100 TABLET ORAL at 14:15

## 2022-08-25 RX ADMIN — MISOPROSTOL 25 MCG: 100 TABLET ORAL at 10:11

## 2022-08-25 RX ADMIN — Medication 2 MILLI-UNITS/MIN: at 23:25

## 2022-08-25 ASSESSMENT — ACTIVITIES OF DAILY LIVING (ADL)
ADLS_ACUITY_SCORE: 18

## 2022-08-25 NOTE — H&P
[facility] OB/GYN Department    History and Physical Note    Jocelyn Causey  1990  [id number]    HPI: Jocelyn Causey is a 32 year old  at 37w3d by LMP c/w 8w5d US, who presents for induction of labor for gestational hypertension. Reports good fetal movement. Irregular contractions, no loss of fluid, no vaginal bleeding.     Pregnancy notable for:  Gestational hypertension  Rh negative  Failed 1 hr GCT, passed 3 hr GTT    OBHX:   OB History    Para Term  AB Living   3 2 2 0 0 2   SAB IAB Ectopic Multiple Live Births   0 0 0 0 2      # Outcome Date GA Lbr Contreras/2nd Weight Sex Delivery Anes PTL Lv   3 Current            2 Term 17 38w2d 08:07 / 00:10 3.204 kg (7 lb 1 oz) F Vag-Spont EPI N DODIE      Name: Lindsay      Apgar1: 8  Apgar5: 9   1 Term 05/15/15 40w3d 01:54 / 01:30 3.402 kg (7 lb 8 oz) F Vag-Spont EPI  DODIE      Name: ang[place]      Apgar1: 9  Apgar5: 9       MedicalHX:   Past Medical History:   Diagnosis Date     Chickenpox      Mild preeclampsia     ,2017       SurgicalHX:   Past Surgical History:   Procedure Laterality Date     LASIK       MOUTH SURGERY      wisdom teeth       Medications:   acetaminophen (TYLENOL) tablet 650 mg  carboprost (HEMABATE) injection 250 mcg  hydrALAZINE (APRESOLINE) injection 10 mg  hydrOXYzine (ATARAX) tablet 50 mg  ketorolac (TORADOL) injection 30 mg   Or  ketorolac (TORADOL) injection 30 mg   Or  ibuprofen (ADVIL/MOTRIN) tablet 800 mg  labetalol (NORMODYNE/TRANDATE) injection 20-80 mg  lactated ringers BOLUS 1,000 mL   Or  lactated ringers BOLUS 500 mL  lactated ringers BOLUS 500 mL  lactated ringers infusion  lactated ringers infusion  lidocaine (LMX4) kit  lidocaine 1 % 0.1-1 mL  lidocaine 1 % 0.1-20 mL  LORazepam (ATIVAN) injection 2 mg  magnesium sulfate 2 g in water intermittent infusion  magnesium sulfate 4 g in 100 mL sterile water (premade)  magnesium sulfate injection 10 g  Medication Instructions - cervical ripening and  induction medications  methylergonovine (METHERGINE) injection 200 mcg  metoclopramide (REGLAN) injection 10 mg   Or  metoclopramide (REGLAN) tablet 10 mg  misoprostol (cervical ripening) (CYTOTEC) quarter-tab 25 mcg  misoprostol (CYTOTEC) tablet 400 mcg   Or  misoprostol (CYTOTEC) tablet 800 mcg  ondansetron (ZOFRAN ODT) ODT tab 4 mg   Or  ondansetron (ZOFRAN) injection 4 mg  oxytocin (PITOCIN) 30 units in 500 mL 0.9% NaCl infusion  oxytocin (PITOCIN) 30 units in 500 mL 0.9% NaCl infusion  oxytocin (PITOCIN) injection 10 Units  oxytocin (PITOCIN) injection 10 Units  sodium chloride (PF) 0.9% PF flush 3 mL  sodium chloride (PF) 0.9% PF flush 3 mL  sodium citrate-citric acid (BICITRA) solution 30 mL  terbutaline (BRETHINE) injection 0.25 mg  tranexamic acid 1 g in 100 mL NS IV bag (premix)    Acetaminophen (TYLENOL PO), Take 1,000 mg by mouth daily as needed  Prenatal Vit-Fe Fumarate-FA (PRENATAL MULTIVITAMIN  PLUS IRON) 27-0.8 MG TABS, Take 1 tablet by mouth daily  Misc. Devices (BREAST PUMP) MISC, 1 each See Admin Instructions        Allergies:  No Known Allergies    FamilyHX:  Family History   Problem Relation Age of Onset     Breast Cancer Maternal Grandmother      Cancer - colorectal Maternal Grandmother         also lung     Cerebrovascular Disease Maternal Grandmother      Respiratory Paternal Grandmother      Bronchitis Paternal Grandmother      Cardiovascular Paternal Grandfather         valve replacement     Pacemaker Paternal Grandfather      Substance Abuse Paternal Grandfather         alcohol     Cirrhosis Paternal Grandfather      Genetic Disorder Other         Shelby Blackfan anemia     Genetic Disorder Other         Paternal 1st Cousin with Down syndrome       SocialHX:   Social History     Socioeconomic History     Marital status:    Occupational History     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[employer]     Comment: nursing   Tobacco Use     Smoking status: Never Smoker     Smokeless tobacco: Never Used   Vaping Use      Vaping Use: Never used   Substance and Sexual Activity     Alcohol use: Not Currently     Alcohol/week: 2.5 standard drinks     Types: 3 Standard drinks or equivalent per week     Comment: occas- quit with pregnancy     Drug use: No     Sexual activity: Yes     Partners: Male   Other Topics Concern      Service No     Blood Transfusions No     Caffeine Concern No     Occupational Exposure No     Hobby Hazards No     Sleep Concern No     Stress Concern Yes     Weight Concern No     Special Diet No     Back Care No     Exercise No     Bike Helmet No     Seat Belt Yes     Self-Exams Yes     Parent/sibling w/ CABG, MI or angioplasty before 65F 55M? No       ROS: 10-point ROS negative except as in HPI     Physical Exam:  Vitals:    22 2100 22 2345   BP: (!) 137/91 125/79   BP Location: Right arm Right arm   Resp: 16 16   Temp: 97.2  F (36.2  C) 97.8  F (36.6  C)   TempSrc: Oral Oral     GEN: resting comfortably in bed, NAD   CV: No heaves or thrills. No peripheral varicosities  PULM: Equal expansion bilaterally, no accessory muscle use  ABD: soft, gravid, non-tender, non-distended  EXT: no edema  SVE: /-3 per RN    NST:  FHT: baseline 130 bpm, moderate variability, + accels, no decels  TOCO: q3-4min    Labs:   CBC RESULTS: Recent Labs   Lab Test 22  2118   WBC 16.2*   RBC 3.95   HGB 12.1   HCT 35.2   MCV 89   MCH 30.6   MCHC 34.4   RDW 13.4        Creatinine 0.51  ALT 20  AST 10  Urine P:C 0.10    GBS Status:   Negative    A/P: Jocelyn Causey is a 32 year old female  at 37w3d, here for IOL for gestational hypertension.    Admit to L&D. Place PIV. Admission labs. COVID negative.  Labor: Cervical ripening with Misoprostol.   FHT: Category 1 FHT.  Continue EFM and toco  Pain: Analgesia PRN  GBS:   Negative    Renetta Vargas DO

## 2022-08-25 NOTE — PLAN OF CARE
Goal Outcome Evaluation:    Plan of Care Reviewed With: patient, spouse     Overall Patient Progress: improving     IOL for gestational hypertension. ALT and AST are WNL's. Creatinine is 0.51, low. Pt SVE 1.5/40/-3, mid position.  Berger of a 4. Will continue Miso cervical ripening. Pt states feeling increased cramping. Elevated BP after SVE, will recheck. Afebrile. Cat 1 tracing. Pt balancing rest and activity. Will continue to assess and monitor. No signs or symptoms of preeclampsia.

## 2022-08-25 NOTE — PLAN OF CARE
S: Admit to Inpatient   A: A:moderate variablility, + accels, no decels, Category I  normal uterine activity  1posterior  Membrane status; intact  Admission on 08/24/2022   Component Date Value    AST 08/24/2022 10     ALT 08/24/2022 20     Creatinine 08/24/2022 0.51 (A)    GFR Estimate 08/24/2022 >90     Total Protein Random Uri* 08/24/2022 0.13     Total Protein Urine g/gr* 08/24/2022 0.10     Creatinine Urine mg/dL 08/24/2022 124     WBC Count 08/24/2022 16.2 (A)    RBC Count 08/24/2022 3.95     Hemoglobin 08/24/2022 12.1     Hematocrit 08/24/2022 35.2     MCV 08/24/2022 89     MCH 08/24/2022 30.6     MCHC 08/24/2022 34.4     RDW 08/24/2022 13.4     Platelet Count 08/24/2022 302     % Neutrophils 08/24/2022 78     % Lymphocytes 08/24/2022 14     % Monocytes 08/24/2022 6     % Eosinophils 08/24/2022 1     % Basophils 08/24/2022 0     % Immature Granulocytes 08/24/2022 1     NRBCs per 100 WBC 08/24/2022 0     Absolute Neutrophils 08/24/2022 12.6 (A)    Absolute Lymphocytes 08/24/2022 2.2     Absolute Monocytes 08/24/2022 0.9     Absolute Eosinophils 08/24/2022 0.2     Absolute Basophils 08/24/2022 0.1     Absolute Immature Granul* 08/24/2022 0.2     Absolute NRBCs 08/24/2022 0.0     SARS CoV2 PCR 08/24/2022 Negative      Dr. ROSSANA Vargas informed of above and admission orders received.   R: Plan of care reviewed with patient. Oriented to room. Reviewed resource binder, [facility] book and paperwork to complete.

## 2022-08-26 ENCOUNTER — HOSPITAL ENCOUNTER (INPATIENT)
Facility: HOSPITAL | Age: 32
LOS: 2 days | Discharge: HOME HEALTH | DRG: 776 | End: 2022-08-28
Attending: OBSTETRICS & GYNECOLOGY | Admitting: OBSTETRICS & GYNECOLOGY
Payer: COMMERCIAL

## 2022-08-26 VITALS
TEMPERATURE: 98.2 F | SYSTOLIC BLOOD PRESSURE: 140 MMHG | RESPIRATION RATE: 18 BRPM | DIASTOLIC BLOOD PRESSURE: 84 MMHG | OXYGEN SATURATION: 100 %

## 2022-08-26 PROCEDURE — 250N000013 HC RX MED GY IP 250 OP 250 PS 637: Performed by: OBSTETRICS & GYNECOLOGY

## 2022-08-26 PROCEDURE — 250N000009 HC RX 250: Performed by: OBSTETRICS & GYNECOLOGY

## 2022-08-26 PROCEDURE — 258N000003 HC RX IP 258 OP 636: Performed by: OBSTETRICS & GYNECOLOGY

## 2022-08-26 PROCEDURE — 00HU33Z INSERTION OF INFUSION DEVICE INTO SPINAL CANAL, PERCUTANEOUS APPROACH: ICD-10-PCS | Performed by: OBSTETRICS & GYNECOLOGY

## 2022-08-26 PROCEDURE — 59400 OBSTETRICAL CARE: CPT | Performed by: OBSTETRICS & GYNECOLOGY

## 2022-08-26 PROCEDURE — 250N000011 HC RX IP 250 OP 636: Performed by: OBSTETRICS & GYNECOLOGY

## 2022-08-26 PROCEDURE — 250N000009 HC RX 250: Performed by: NURSE ANESTHETIST, CERTIFIED REGISTERED

## 2022-08-26 PROCEDURE — 722N000001 HC LABOR CARE VAGINAL DELIVERY SINGLE

## 2022-08-26 PROCEDURE — 120N000001 HC R&B MED SURG/OB

## 2022-08-26 PROCEDURE — 250N000011 HC RX IP 250 OP 636: Performed by: NURSE ANESTHETIST, CERTIFIED REGISTERED

## 2022-08-26 PROCEDURE — 0HQ9XZZ REPAIR PERINEUM SKIN, EXTERNAL APPROACH: ICD-10-PCS | Performed by: OBSTETRICS & GYNECOLOGY

## 2022-08-26 PROCEDURE — 3E0R3BZ INTRODUCTION OF ANESTHETIC AGENT INTO SPINAL CANAL, PERCUTANEOUS APPROACH: ICD-10-PCS | Performed by: OBSTETRICS & GYNECOLOGY

## 2022-08-26 PROCEDURE — 10907ZC DRAINAGE OF AMNIOTIC FLUID, THERAPEUTIC FROM PRODUCTS OF CONCEPTION, VIA NATURAL OR ARTIFICIAL OPENING: ICD-10-PCS | Performed by: OBSTETRICS & GYNECOLOGY

## 2022-08-26 RX ORDER — MISOPROSTOL 200 UG/1
800 TABLET ORAL
Status: DISCONTINUED | OUTPATIENT
Start: 2022-08-26 | End: 2022-08-26 | Stop reason: HOSPADM

## 2022-08-26 RX ORDER — BISACODYL 10 MG
10 SUPPOSITORY, RECTAL RECTAL DAILY PRN
Status: DISCONTINUED | OUTPATIENT
Start: 2022-08-26 | End: 2022-08-26 | Stop reason: HOSPADM

## 2022-08-26 RX ORDER — BISACODYL 10 MG
10 SUPPOSITORY, RECTAL RECTAL DAILY PRN
Status: DISCONTINUED | OUTPATIENT
Start: 2022-08-26 | End: 2022-08-28 | Stop reason: HOSPADM

## 2022-08-26 RX ORDER — EPHEDRINE SULFATE 50 MG/ML
5 INJECTION, SOLUTION INTRAMUSCULAR; INTRAVENOUS; SUBCUTANEOUS
Status: DISCONTINUED | OUTPATIENT
Start: 2022-08-26 | End: 2022-08-26 | Stop reason: HOSPADM

## 2022-08-26 RX ORDER — METHYLERGONOVINE MALEATE 0.2 MG/ML
200 INJECTION INTRAVENOUS
Status: DISCONTINUED | OUTPATIENT
Start: 2022-08-26 | End: 2022-08-28 | Stop reason: HOSPADM

## 2022-08-26 RX ORDER — NALBUPHINE HYDROCHLORIDE 10 MG/ML
2.5-5 INJECTION, SOLUTION INTRAMUSCULAR; INTRAVENOUS; SUBCUTANEOUS EVERY 6 HOURS PRN
Status: DISCONTINUED | OUTPATIENT
Start: 2022-08-26 | End: 2022-08-26

## 2022-08-26 RX ORDER — OXYTOCIN 10 [USP'U]/ML
10 INJECTION, SOLUTION INTRAMUSCULAR; INTRAVENOUS
Status: DISCONTINUED | OUTPATIENT
Start: 2022-08-26 | End: 2022-08-26

## 2022-08-26 RX ORDER — NALOXONE HYDROCHLORIDE 0.4 MG/ML
0.2 INJECTION, SOLUTION INTRAMUSCULAR; INTRAVENOUS; SUBCUTANEOUS
Status: DISCONTINUED | OUTPATIENT
Start: 2022-08-26 | End: 2022-08-26

## 2022-08-26 RX ORDER — DOCUSATE SODIUM 100 MG/1
100 CAPSULE, LIQUID FILLED ORAL DAILY
Status: DISCONTINUED | OUTPATIENT
Start: 2022-08-27 | End: 2022-08-28 | Stop reason: HOSPADM

## 2022-08-26 RX ORDER — MISOPROSTOL 200 UG/1
800 TABLET ORAL
Status: DISCONTINUED | OUTPATIENT
Start: 2022-08-26 | End: 2022-08-28 | Stop reason: HOSPADM

## 2022-08-26 RX ORDER — OXYTOCIN/0.9 % SODIUM CHLORIDE 30/500 ML
340 PLASTIC BAG, INJECTION (ML) INTRAVENOUS CONTINUOUS PRN
Status: DISCONTINUED | OUTPATIENT
Start: 2022-08-26 | End: 2022-08-28 | Stop reason: HOSPADM

## 2022-08-26 RX ORDER — IBUPROFEN 800 MG/1
800 TABLET, FILM COATED ORAL EVERY 6 HOURS PRN
Status: DISCONTINUED | OUTPATIENT
Start: 2022-08-26 | End: 2022-08-28 | Stop reason: HOSPADM

## 2022-08-26 RX ORDER — LIDOCAINE HYDROCHLORIDE AND EPINEPHRINE 15; 5 MG/ML; UG/ML
INJECTION, SOLUTION EPIDURAL PRN
Status: DISCONTINUED | OUTPATIENT
Start: 2022-08-26 | End: 2022-08-26

## 2022-08-26 RX ORDER — MISOPROSTOL 200 UG/1
400 TABLET ORAL
Status: DISCONTINUED | OUTPATIENT
Start: 2022-08-26 | End: 2022-08-26 | Stop reason: HOSPADM

## 2022-08-26 RX ORDER — HYDROCORTISONE 25 MG/G
CREAM TOPICAL 3 TIMES DAILY PRN
Status: DISCONTINUED | OUTPATIENT
Start: 2022-08-26 | End: 2022-08-26 | Stop reason: HOSPADM

## 2022-08-26 RX ORDER — NALOXONE HYDROCHLORIDE 0.4 MG/ML
0.4 INJECTION, SOLUTION INTRAMUSCULAR; INTRAVENOUS; SUBCUTANEOUS
Status: DISCONTINUED | OUTPATIENT
Start: 2022-08-26 | End: 2022-08-26

## 2022-08-26 RX ORDER — IBUPROFEN 800 MG/1
800 TABLET, FILM COATED ORAL EVERY 6 HOURS PRN
Status: DISCONTINUED | OUTPATIENT
Start: 2022-08-26 | End: 2022-08-26 | Stop reason: HOSPADM

## 2022-08-26 RX ORDER — LIDOCAINE HYDROCHLORIDE 20 MG/ML
INJECTION, SOLUTION EPIDURAL; INFILTRATION; INTRACAUDAL; PERINEURAL PRN
Status: DISCONTINUED | OUTPATIENT
Start: 2022-08-26 | End: 2022-08-26

## 2022-08-26 RX ORDER — MODIFIED LANOLIN
OINTMENT (GRAM) TOPICAL
Status: DISCONTINUED | OUTPATIENT
Start: 2022-08-26 | End: 2022-08-28 | Stop reason: HOSPADM

## 2022-08-26 RX ORDER — OXYTOCIN/0.9 % SODIUM CHLORIDE 30/500 ML
340 PLASTIC BAG, INJECTION (ML) INTRAVENOUS CONTINUOUS PRN
Status: DISCONTINUED | OUTPATIENT
Start: 2022-08-26 | End: 2022-08-26

## 2022-08-26 RX ORDER — MISOPROSTOL 200 UG/1
400 TABLET ORAL
Status: DISCONTINUED | OUTPATIENT
Start: 2022-08-26 | End: 2022-08-28 | Stop reason: HOSPADM

## 2022-08-26 RX ORDER — HYDROCORTISONE 25 MG/G
CREAM TOPICAL 3 TIMES DAILY PRN
Status: DISCONTINUED | OUTPATIENT
Start: 2022-08-26 | End: 2022-08-28 | Stop reason: HOSPADM

## 2022-08-26 RX ORDER — MODIFIED LANOLIN
OINTMENT (GRAM) TOPICAL
Status: DISCONTINUED | OUTPATIENT
Start: 2022-08-26 | End: 2022-08-26 | Stop reason: HOSPADM

## 2022-08-26 RX ORDER — ACETAMINOPHEN 325 MG/1
650 TABLET ORAL EVERY 4 HOURS PRN
Status: DISCONTINUED | OUTPATIENT
Start: 2022-08-26 | End: 2022-08-28 | Stop reason: HOSPADM

## 2022-08-26 RX ORDER — DOCUSATE SODIUM 100 MG/1
100 CAPSULE, LIQUID FILLED ORAL DAILY
Status: DISCONTINUED | OUTPATIENT
Start: 2022-08-26 | End: 2022-08-26 | Stop reason: HOSPADM

## 2022-08-26 RX ORDER — METHYLERGONOVINE MALEATE 0.2 MG/ML
200 INJECTION INTRAVENOUS
Status: DISCONTINUED | OUTPATIENT
Start: 2022-08-26 | End: 2022-08-26

## 2022-08-26 RX ORDER — ACETAMINOPHEN 325 MG/1
650 TABLET ORAL EVERY 4 HOURS PRN
Status: DISCONTINUED | OUTPATIENT
Start: 2022-08-26 | End: 2022-08-26

## 2022-08-26 RX ORDER — CARBOPROST TROMETHAMINE 250 UG/ML
250 INJECTION, SOLUTION INTRAMUSCULAR
Status: DISCONTINUED | OUTPATIENT
Start: 2022-08-26 | End: 2022-08-28 | Stop reason: HOSPADM

## 2022-08-26 RX ORDER — CARBOPROST TROMETHAMINE 250 UG/ML
250 INJECTION, SOLUTION INTRAMUSCULAR
Status: DISCONTINUED | OUTPATIENT
Start: 2022-08-26 | End: 2022-08-26

## 2022-08-26 RX ORDER — TRANEXAMIC ACID 10 MG/ML
1 INJECTION, SOLUTION INTRAVENOUS EVERY 30 MIN PRN
Status: DISCONTINUED | OUTPATIENT
Start: 2022-08-26 | End: 2022-08-26 | Stop reason: HOSPADM

## 2022-08-26 RX ORDER — OXYTOCIN 10 [USP'U]/ML
10 INJECTION, SOLUTION INTRAMUSCULAR; INTRAVENOUS
Status: DISCONTINUED | OUTPATIENT
Start: 2022-08-26 | End: 2022-08-28 | Stop reason: HOSPADM

## 2022-08-26 RX ADMIN — ONDANSETRON 4 MG: 2 INJECTION INTRAMUSCULAR; INTRAVENOUS at 11:37

## 2022-08-26 RX ADMIN — Medication 10 ML/HR: at 09:52

## 2022-08-26 RX ADMIN — Medication 340 ML/HR: at 11:57

## 2022-08-26 RX ADMIN — IBUPROFEN 800 MG: 800 TABLET ORAL at 16:16

## 2022-08-26 RX ADMIN — LIDOCAINE HYDROCHLORIDE AND EPINEPHRINE 5 ML: 15; 5 INJECTION, SOLUTION EPIDURAL at 09:47

## 2022-08-26 RX ADMIN — SODIUM CHLORIDE, POTASSIUM CHLORIDE, SODIUM LACTATE AND CALCIUM CHLORIDE: 600; 310; 30; 20 INJECTION, SOLUTION INTRAVENOUS at 11:29

## 2022-08-26 RX ADMIN — Medication 10 ML: at 09:50

## 2022-08-26 RX ADMIN — LIDOCAINE HYDROCHLORIDE 10 ML: 20 INJECTION, SOLUTION EPIDURAL; INFILTRATION; INTRACAUDAL; PERINEURAL at 11:36

## 2022-08-26 RX ADMIN — IBUPROFEN 800 MG: 800 TABLET ORAL at 22:21

## 2022-08-26 ASSESSMENT — ACTIVITIES OF DAILY LIVING (ADL)
ADLS_ACUITY_SCORE: 18
ADLS_ACUITY_SCORE: 35
ADLS_ACUITY_SCORE: 18
ADLS_ACUITY_SCORE: 35

## 2022-08-26 NOTE — PROGRESS NOTES
[facility]  Labor Progress Note    Subjective:  Patient doing well. Not having painful contractions yet.     Objective:   Patient Vitals for the past 4 hrs:   BP Temp Temp src Resp SpO2   22 1100 136/89 -- -- -- 100 %   22 1055 125/84 -- -- -- 99 %   22 1050 134/84 -- -- -- 98 %   22 1032 124/78 -- -- -- --   22 1027 128/80 -- -- -- --   22 1023 134/82 -- -- -- --   22 1017 129/79 -- -- -- --   22 1012 124/76 -- -- -- --   22 1005 (!) 144/91 -- -- -- --   22 1001 (!) 144/95 -- -- -- --   22 0959 137/86 98.2  F (36.8  C) Oral 16 96 %   22 0955 133/83 -- -- -- --   22 0953 134/82 -- -- -- --   22 0951 (!) 141/92 -- -- -- --   22 0949 (!) 144/96 -- -- -- 97 %   22 0822 -- 98.2  F (36.8  C) Oral 16 --   22 0816 129/84 -- -- -- --     SVE: 3/50/-2    FHT: Baseline 150s, moderate variability, + accelerations, intermittent early and late decelerations  [place]: q3-4    Assessment/Plan:  Ms. Jocelyn Causey is a 32 year old  at 37w4d here for IOL in the setting of gHTN    - Labor   - s/p miso on pitocin   - AROM completed with clear fluid   - Fen/GI: Clear liquid diet, IVF   - SVE: 3/50/-2    - Membranes: ruptured at 0915 with clear fluid   - Plan: continue to titrate pitocin   - Pain management: Desires epidural     -Fetal Well Being   - Category 2 FHT. Overall reassuring, will continue to monitor closely for appropriate progress in the setting of Cat 2 FHT   - Continue EFM and [place]      Louann Todd MD   Late entry from 915

## 2022-08-26 NOTE — PROGRESS NOTES
IOL for chronic Htn Multip  Tolerating Miso well  /82 (BP Location: Right arm, Patient Position: Semi-Wang's, Cuff Size: Adult Regular)   Temp 98.1  F (36.7  C) (Oral)   Resp 16   LMP 12/06/2021   FHR Cat I  Plan Pitocin IOL as soon as cervical change regarding favorable Bishops score    Dimitri Patten MD

## 2022-08-26 NOTE — ANESTHESIA PREPROCEDURE EVALUATION
Anesthesia Pre-Procedure Evaluation    Patient: Jocelyn Causey   MRN: [id number] : 1990        Procedure : * No procedures listed *          Past Medical History:   Diagnosis Date     Chickenpox      Mild preeclampsia     ,      Past Surgical History:   Procedure Laterality Date     LASIK       MOUTH SURGERY      wisdom teeth      No Known Allergies   Social History     Tobacco Use     Smoking status: Never Smoker     Smokeless tobacco: Never Used   Substance Use Topics     Alcohol use: Not Currently     Alcohol/week: 2.5 standard drinks     Types: 3 Standard drinks or equivalent per week     Comment: occas- quit with pregnancy      Wt Readings from Last 1 Encounters:   22 110.4 kg (243 lb 6.4 oz)        Anesthesia Evaluation   Pt has had prior anesthetic. Type: General, MAC and Regional.    No history of anesthetic complications       ROS/MED HX  ENT/Pulmonary:  - neg pulmonary ROS     Neurologic:  - neg neurologic ROS     Cardiovascular:     (+) --PIH ---    METS/Exercise Tolerance:     Hematologic:  - neg hematologic  ROS     Musculoskeletal:       GI/Hepatic:  - neg GI/hepatic ROS     Renal/Genitourinary:       Endo:  - neg endo ROS     Psychiatric/Substance Use:  - neg psychiatric ROS     Infectious Disease:       Malignancy:       Other:            Physical Exam    Airway        Mallampati: II   TM distance: > 3 FB   Neck ROM: full   Mouth opening: > 3 cm    Respiratory Devices and Support         Dental  no notable dental history         Cardiovascular   cardiovascular exam normal          Pulmonary   pulmonary exam normal                OUTSIDE LABS:  CBC:   Lab Results   Component Value Date    WBC 16.2 (H) 2022    WBC 16.4 (H) 2022    HGB 12.1 2022    HGB 12.5 2022    HCT 35.2 2022    HCT 37.2 2022     2022     2022     BMP:   Lab Results   Component Value Date     09/10/2013    POTASSIUM 4.6 09/10/2013    CHLORIDE  103 09/10/2013    CO2 27 09/10/2013    BUN 8 09/10/2013    CR 0.51 (L) 08/24/2022    CR 0.61 08/19/2022    GLC 84 09/10/2013     COAGS: No results found for: PTT, INR, FIBR  POC:   Lab Results   Component Value Date    HCG Negative 12/31/2020     HEPATIC:   Lab Results   Component Value Date    ALBUMIN 4.5 09/10/2013    PROTTOTAL 7.9 09/10/2013    ALT 20 08/24/2022    AST 10 08/24/2022    ALKPHOS 82 09/10/2013    BILITOTAL 0.4 09/10/2013     OTHER:   Lab Results   Component Value Date    JARROD 9.4 09/10/2013    TSH 1.31 09/10/2013       Anesthesia Plan    ASA Status:  2      Anesthesia Type: Epidural.              Consents    Anesthesia Plan(s) and associated risks, benefits, and realistic alternatives discussed. Questions answered and patient/representative(s) expressed understanding.    - Discussed:     - Discussed with:  Patient         Postoperative Care            Comments:           neg OB ROS.       Josh Bardales CRNA, APRN CRNA

## 2022-08-26 NOTE — PROGRESS NOTES
1900 - pt up walking in halls, BP in normal range at this time.   2000 - pt back to room, using birthing ball. Edu on various techniques using the ball, pt agreeable.   2025 - miso administered per order, pt back to bed  2130 - pt back up to ball  2230 - pt back to bed at 2215, assessment and vital performed per RN, SVE 2.5/50-60/-3. POC to start Pitocin, pt agreeable.  2300 - education provided on spinning babies techniques, pt agreeable. Inversion, shake the apples, bilateral side lying release performed, pt tolerated well.   2325 - Pitocin initiated per order.  2345 - pt resting supine. Education provided on frequent positioning, pt agreeable.   0030 - pt lying on left when RN to room for position charge. Pt agreeable, right side with peanut ball, knees apart.  0100 - pitocin increased to 4mU/min, pt up to bathroom. Pt place on left side with peanut ball, knees apart.    Report off to GIDEON Murillo. All questions answered at this time.

## 2022-08-26 NOTE — PROGRESS NOTES
IV in R hand infiltrated. Placed new IV in L wrist. Pt is currently walking the halls. Pitocin is at 14 mu. Pt off for less then 5 min. Restarted at 14mu.

## 2022-08-26 NOTE — ANESTHESIA PROCEDURE NOTES
Epidural catheter Procedure Note    Pre-Procedure   Staff -        CRNA: Josh Bardales APRN CRNA       Performed By: CRNA       Location: OB       Pre-Anesthestic Checklist: patient identified, IV checked, risks and benefits discussed, informed consent, monitors and equipment checked, pre-op evaluation and at physician/surgeon's request  Timeout:       Correct Patient: Yes        Correct Procedure: Yes        Correct Site: Yes        Correct Position: Yes   Procedure Documentation  Procedure: epidural catheter       Patient Position: sitting       Patient Prep/Sterile Barriers: sterile gloves, mask, patient draped       Skin prep: DuraPrep       Local skin infiltrated with mL of 1% lidocaine.  (midline approach).       Technique: LORT saline        Needle Type: ToPOLYBONAy needle       Needle Gauge: 17.        Needle Length (Inches): 3.5        Catheter: 19 G.          Catheter threaded easily.         5 cm epidural space.         Threaded 9 cm at skin.         # of attempts: 1 and  # of redirects:     Assessment/Narrative         Paresthesias: No.       Test dose of mL lidocaine 1.5% w/ 1:200,000 epinephrine at.         Test dose negative, 3 minutes after injection, for signs of intravascular, subdural, or intrathecal injection.       Insertion/Infusion Method: LORT saline       Aspiration negative for Heme or CSF via Epidural Catheter.       Sensory Level Left: T6.       Sensory Level Right: T6.     Comments:  VAS pain score prior to epidural:6    VAS pain score after epidural:1    Pt. Tolerated well, FHR stable.

## 2022-08-26 NOTE — PLAN OF CARE
Pt AROM by Dr. Todd at 0914, clear moderate fluid. 3/50/-2. Pt requested to have her epidural placed. CRNA at bedside at 0940. By this time pt is requesting it to be started. Consent signed. Pt had a hot spot on her right lower side of her abd. Pt was placed in a right tilt and PCA button was pushed. Pt is now comfortable.

## 2022-08-26 NOTE — L&D DELIVERY NOTE
Jocelyn Causey is a 32 year old  presented at 37w4d for IOL for gHTN. SHe received miso followed by pitocin and AROM for induction. She progressed through labor until complete dilation. Patient did not labor down. She pushed effectively and delivered a viable female infant with Apgars 8/9 over a 1st degree laceration. Weight is pending and skin-to-skin was performed. Delayed cord clamping was performed. Cord was clamped and cut. Placenta delivered via active management and was noted to be intact with a three vessel cord.   1st degree laceration was repaired with 3-0 vicryl in standard fashion QBL 150ml. Sponge and needle counts correct. Mom and baby were transported to postpartum in stable condition.     Lucinda Todd MD 2022 12:12 PM       OB Vaginal Delivery Note    Jocelyn Causey MRN# [id number]   Age: 32 year old YOB: 1990       GA: 37w4d  GP:   Labor Complications: None   EBL:   mL  Delivery QBL:    Delivery Type: Vaginal, Spontaneous   ROM to Delivery Time: (Delivered) Hours: 2 Minutes: 40   Weight: 2.94 kg (6 lb 7.7 oz)    1 Minute 5 Minute 10 Minute   Apgar Totals: 8   9        MAICOL SMITH;FAIZAN BROWN;LUCINDA TODD     Delivery Details:  Jocelyn Causey, a 32 year old  female delivered a viable infant with apgars of 8  and 9 . Patient was fully dilated and pushing after   hours   minutes in active labor. Delivery was via vaginal, spontaneous  to a sterile field under epidural  anesthesia. Infant delivered in vertex    occiput  anterior  position. Anterior and posterior shoulders delivered without difficulty. The cord was clamped, cut twice and 3 vessels  were noted. Cord blood was obtained in routine fashion with the following disposition: lab .      Cord complications: none   Placenta delivered at 2022 11:57 AM . Placental disposition was Hospital disposal . Fundal massage performed and fundus found to be firm.      Episiotomy: none    Perineum, vagina, cervix were inspected, and the following lacerations were noted:   Perineal lacerations: 1st                .    Excellent hemostasis was noted. Needle count correct. Infant and patient in delivery room in good and stable condition.        Marium YinJocelyn [[id number]]    Labor Events     labor?: No   steroids: None  Labor Type: Induction/Cervical ripening  Predominate monitoring during 1st stage: continuous electronic fetal monitoring     Rupture date/time: 22 0914   Rupture type: Artificial Rupture of Membranes  Fluid color: Clear  Fluid odor: Normal     Induction: Misoprostol, Oxytocin, AROM  Induction date/time:     Cervical ripening date/time: 22   Indications for induction: Hypertension     Augmentation: AROM, Oxytocin  Indications for augmentation: Ineffective Contraction Pattern  1:1 continuous labor support provided by?: RN       Delivery/Placenta Date and Time    Delivery Date: 22 Delivery Time: 11:54 AM   Placenta Date/Time: 2022 11:57 AM  Oxytocin given at the time of delivery: after delivery of baby  Delivering clinician: Louann Todd MD   Other personnel present at delivery:  Provider Role   Janine Haley RN Delivery Nurse   Prince Enciso Charge Nurse   Louann Todd MD Obstetrician         Vaginal Counts     Initial count performed by 2 team members:  Two Team Members   prince Haley       Needles Suture Needles Sponges (RETIRED) Instruments   Initial counts 2  5    Added to count  1     Relief counts       Final counts             Placed during labor Accounted for at the end of labor   FSE No    IUPC No    Cervidil NA                      Apgars    Living status: Living   1 Minute 5 Minute 10 Minute 15 Minute 20 Minute   Skin color: 0  1       Heart rate: 2  2       Reflex irritability: 2  2       Muscle tone: 2  2       Respiratory effort: 2  2       Total:  8  9       Apgars assigned by: MAICOL VALADEZ RN     Cord    Vessels: 3 Vessels    Cord Complications: None               Cord Blood Disposition: Lab    Gases Sent?: No    Delayed cord clamping?: Yes    Cord Clamping Delay (seconds):  seconds        Resuscitation    Methods: None  Output in Delivery Room: Voided      Measurements    Weight: 6 lb 7.7 oz    Output in delivery room: Voided     Labor Events and Shoulder Dystocia    Fetal Tracing Prior to Delivery: Category 2     Delivery (Maternal) (Provider to Complete) (512885)    Episiotomy: None  Perineal lacerations: 1st Repaired?: Yes   Repair suture: 3-0 Vicryl  Genital tract inspection done: Pos     Blood Loss  Mother: Jocelyn Causey #[id number]   Start of Mother's Information    Delivery Blood Loss  22 2354 - 22 1211    None           End of Mother's Information  Mother: Jocelyn Causey #[id number]          Delivery - Provider to Complete (900343)    Delivering clinician: Louann Todd MD  Attempted Delivery Types (Choose all that apply): Spontaneous Vaginal Delivery  Delivery Type (Choose the 1 that will go to the Birth History): Vaginal, Spontaneous                   Other personnel:  Provider Role   Maicol Haley RN Delivery Nurse   Saranya Enciso Charge Nurse   Louann Todd MD Obstetrician                Placenta    Date/Time: 2022 11:57 AM  Removal: Spontaneous  Disposition: Hospital disposal           Anesthesia    Method: Epidural  Cervical dilation at placement: 0-3                Presentation and Position    Presentation: Vertex     Occiput Anterior                 Louann Todd MD

## 2022-08-26 NOTE — PROGRESS NOTES
Pt amb to the [facility] and did skin to skin at 1530. Infant left at 1640 via EMS to [place]. There was miscommunitation about a bed for the pt to be transferred also. If there is no bed available at 1800 pt will discharge early. Pt has showered and voided. SL was removed. Pt denies pain at this time.

## 2022-08-26 NOTE — PLAN OF CARE
S:Delivery  B:Induced  Labor,37w4d    Lab Results   Component Value Date    GBS Negative 10/16/2017    with antibiotic treatment not indicated 4 hours prior to delivery.  A: Patient delivered   lac 1st degree at 1154 with Dr. JUD Todd in attendance and baby placed on mother's abdomen for delayed cord clamping. Baby dried and stimulated. Baby placed  skin to skin @ 1154 and taken to the warmer at 3 minutes of life for further evaluation.. Apgars 8/9.Delivery .  IV infusion of Oxytocin  infused. Placenta removal spontaneous. MD does not want placenta sent to pathology.  See Flowsheet for VS and PP checks. Delivery QBL (mL): 150 mL.  Labor care plan goals met, transition now to postpartum care.   R: Expect routine postpartum care. Planning to breastfeed when able

## 2022-08-27 PROCEDURE — 250N000013 HC RX MED GY IP 250 OP 250 PS 637: Performed by: OBSTETRICS & GYNECOLOGY

## 2022-08-27 PROCEDURE — 120N000001 HC R&B MED SURG/OB

## 2022-08-27 RX ADMIN — ACETAMINOPHEN 650 MG: 325 TABLET ORAL at 10:49

## 2022-08-27 RX ADMIN — IBUPROFEN 800 MG: 800 TABLET ORAL at 10:49

## 2022-08-27 RX ADMIN — IBUPROFEN 800 MG: 800 TABLET ORAL at 16:52

## 2022-08-27 RX ADMIN — IBUPROFEN 800 MG: 800 TABLET ORAL at 04:11

## 2022-08-27 RX ADMIN — ACETAMINOPHEN 650 MG: 325 TABLET ORAL at 16:51

## 2022-08-27 RX ADMIN — DOCUSATE SODIUM 100 MG: 100 CAPSULE, LIQUID FILLED ORAL at 07:55

## 2022-08-27 ASSESSMENT — ACTIVITIES OF DAILY LIVING (ADL)
ADLS_ACUITY_SCORE: 35

## 2022-08-27 NOTE — PROGRESS NOTES
[facility]  Obstetrics Postpartum Note    S: Patient is resting in bed, no complaints  Delivered yesterday at [facility] for gHTN.  Baby transferred to NICU here for RDS and Jocelyn transferred for ongoing post-partum care.    Eating lunch in bed this morning, no issues.  Ambulating well.  Pain well-controlled.  No bowel/bladder issues.  Questions about options to be near baby after discharge, she reports she lives about an hour away.    O:   Patient Vitals for the past 24 hrs:   BP Temp Temp src Pulse Resp SpO2   22 0805 113/63 97.8  F (36.6  C) Oral 78 20 --   22 0145 113/61 98  F (36.7  C) Oral 76 16 98 %   22 204 (!) 149/92 97.9  F (36.6  C) Oral 90 16 97 %     No intake/output data recorded.    Gen:  Resting comfortably, NAD  CV:  RRR  Pulm:  clear      Hgb:   Hemoglobin   Date Value Ref Range Status   2022 12.1 11.7 - 15.7 g/dL Final   2022 12.5 11.7 - 15.7 g/dL Final   2017 12.4 11.7 - 15.7 g/dL Final   10/31/2017 12.3 11.7 - 15.7 g/dL Final       Assessment/Plan:  32 year old  on PPD#1, post-partum transfer from [facility] to baby transferred to NICU    -Doing well, meeting postpartum goals  -Discussed with charge RN, limited otions for rooming in with current census, will place SW consult to assist if any options for patient to stay nearer to baby especially while establishing breastfeeding  -BP's normal    Dispo: Anticipate discharge once meeting postpartum goals, PPD#1-2    Yisel Denney MD  2022, 2:27 PM

## 2022-08-27 NOTE — PROGRESS NOTES
Patient left with EMS at 2005 in stable condition. Report called to [facility].

## 2022-08-27 NOTE — H&P
[facility] Labor and Delivery History and Physical    Jocelyn Causey MRN# [id number]   Age: 32 year old YOB: 1990     Date of Admission:  2022    Primary care provider: No Ref-Primary, Physician           Chief Complaint:   Jocelyn Causey is a 32 year old female who is 37w5d 10 hours postpartum who is being admitted for post partum cares.  Infant was transferred to [place] for NICU care.          Pregnancy history:     OBSTETRIC HISTORY:    OB History    Para Term  AB Living   3 2 2 0 0 2   SAB IAB Ectopic Multiple Live Births   0 0 0 0 2      # Outcome Date GA Lbr Contreras/2nd Weight Sex Delivery Anes PTL Lv   3 Current            2 Term 17 38w2d 08:07 / 00:10 3.204 kg (7 lb 1 oz) F Vag-Spont EPI N DODIE      Name: Lindsay      Apgar1: 8  Apgar5: 9   1 Term 05/15/15 40w3d 01:54 / 01:30 3.402 kg (7 lb 8 oz) F Vag-Spont EPI  DODIE      Name: angeles      Apgar1: 9  Apgar5: 9       EDC: Estimated Date of Delivery: 22    Prenatal Labs:   Lab Results   Component Value Date    ABO O 2017    RH Neg 2017    AS Negative 2022    HEPBANG Nonreactive 2022    CHPCRT  2009     Negative for C. trachomatis rRNA by transcription mediated amplification.   A negative result by transcription mediated amplification does not preclude the   presence of C. trachomatis infection because results are dependent on proper   and adequate collection, absence of inhibitors, and sufficient rRNA to be   detected.    GCPCRT  2009     Negative for N. gonorrhoeae rRNA by transcription mediated amplification.   A negative result by transcription mediated amplification does not preclude the   presence of N. gonorrhoeae infection because results are dependent on proper   and adequate collection, absence of inhibitors, and sufficient rRNA to be   detected.    TREPAB Negative 2017    HGB 12.1 2022       GBS Status:   Lab Results   Component Value  Date    GBS Negative 10/16/2017       Active Problem List  Patient Active Problem List   Diagnosis     CARDIOVASCULAR SCREENING; LDL GOAL LESS THAN 160     Rh negative status during pregnancy in first trimester     Prenatal care, subsequent pregnancy     Gestational hypertension, third trimester     Postpartum state       Medication Prior to Admission  Medications Prior to Admission   Medication Sig Dispense Refill Last Dose     Acetaminophen (TYLENOL PO) Take 1,000 mg by mouth daily as needed        Misc. Devices (BREAST PUMP) MISC 1 each See Admin Instructions 1 each 0      Prenatal Vit-Fe Fumarate-FA (PRENATAL MULTIVITAMIN  PLUS IRON) 27-0.8 MG TABS Take 1 tablet by mouth daily      .          Physical Exam:   Vitals were reviewed  Temp: 97.9  F (36.6  C) Temp src: Oral BP: (!) 149/92 Pulse: 90   Resp: 16 SpO2: 97 %      Constitutional:   awake, alert, cooperative, no apparent distress, and appears stated age     Lungs:   No increased work of breathing, good air exchange, clear to auscultation bilaterally, no crackles or wheezing     Cardiovascular:   Normal apical impulse, regular rate and rhythm, normal S1 and S2, no S3 or S4, and no murmur noted     abd- fundus firm at unit(s)-2           Assessment:   Jocelyn Causey is a 37w5d pregnant female s/p spontaneous vaginal delivery without complications  admitted for routine post partum care            Plan:   Admit  Routine postpartum cares    Yisel Perez MD  [facility]  [phone]      Yisel Perez MD

## 2022-08-27 NOTE — PLAN OF CARE
Problem: Plan of Care - These are the overarching goals to be used throughout the patient stay.    Goal: Optimal Comfort and Wellbeing  8/27/2022 1145 by Madhuri Olea, RN  Outcome: Ongoing, Progressing  8/27/2022 1104 by Madhuri Olea, RN  Outcome: Ongoing, Progressing  Intervention: Provide Person-Centered Care  Recent Flowsheet Documentation  Taken 8/27/2022 0805 by Madhuri Olea, RN  Trust Relationship/Rapport:   care explained   questions answered   questions encouraged   reassurance provided   Jocelyn is using tylenol and ibuprofen for pain that is described as intermittent cramping. She pumps and is doing skin to skin. She would like to see Lactation when her baby is starting oral feeds. Currently postpartum checks are stable and patient is independently visiting baby in NICU.

## 2022-08-27 NOTE — PLAN OF CARE
Patient arrived via EMS from [facility]. Dr Nelson called and notified of patient arrival. Orders placed by provider.

## 2022-08-28 VITALS
SYSTOLIC BLOOD PRESSURE: 140 MMHG | DIASTOLIC BLOOD PRESSURE: 85 MMHG | TEMPERATURE: 98 F | RESPIRATION RATE: 16 BRPM | OXYGEN SATURATION: 98 % | HEART RATE: 84 BPM

## 2022-08-28 LAB
ALT SERPL W P-5'-P-CCNC: 12 U/L (ref 0–45)
AST SERPL W P-5'-P-CCNC: 12 U/L (ref 0–40)
ERYTHROCYTE [DISTWIDTH] IN BLOOD BY AUTOMATED COUNT: 14 % (ref 10–15)
HCT VFR BLD AUTO: 34.8 % (ref 35–47)
HGB BLD-MCNC: 11.6 G/DL (ref 11.7–15.7)
MCH RBC QN AUTO: 30.5 PG (ref 26.5–33)
MCHC RBC AUTO-ENTMCNC: 33.3 G/DL (ref 31.5–36.5)
MCV RBC AUTO: 92 FL (ref 78–100)
PLATELET # BLD AUTO: 233 10E3/UL (ref 150–450)
RBC # BLD AUTO: 3.8 10E6/UL (ref 3.8–5.2)
WBC # BLD AUTO: 10.7 10E3/UL (ref 4–11)

## 2022-08-28 PROCEDURE — 84460 ALANINE AMINO (ALT) (SGPT): CPT | Performed by: OBSTETRICS & GYNECOLOGY

## 2022-08-28 PROCEDURE — 84450 TRANSFERASE (AST) (SGOT): CPT | Performed by: OBSTETRICS & GYNECOLOGY

## 2022-08-28 PROCEDURE — 36415 COLL VENOUS BLD VENIPUNCTURE: CPT | Performed by: OBSTETRICS & GYNECOLOGY

## 2022-08-28 PROCEDURE — 85027 COMPLETE CBC AUTOMATED: CPT | Performed by: OBSTETRICS & GYNECOLOGY

## 2022-08-28 PROCEDURE — 250N000013 HC RX MED GY IP 250 OP 250 PS 637: Performed by: OBSTETRICS & GYNECOLOGY

## 2022-08-28 RX ADMIN — ACETAMINOPHEN 650 MG: 325 TABLET ORAL at 13:24

## 2022-08-28 RX ADMIN — IBUPROFEN 800 MG: 800 TABLET ORAL at 00:05

## 2022-08-28 RX ADMIN — IBUPROFEN 800 MG: 800 TABLET ORAL at 13:25

## 2022-08-28 RX ADMIN — ACETAMINOPHEN 650 MG: 325 TABLET ORAL at 00:05

## 2022-08-28 RX ADMIN — IBUPROFEN 800 MG: 800 TABLET ORAL at 07:22

## 2022-08-28 RX ADMIN — ACETAMINOPHEN 650 MG: 325 TABLET ORAL at 07:22

## 2022-08-28 ASSESSMENT — ACTIVITIES OF DAILY LIVING (ADL)
ADLS_ACUITY_SCORE: 18
ADLS_ACUITY_SCORE: 35
ADLS_ACUITY_SCORE: 18
ADLS_ACUITY_SCORE: 35

## 2022-08-28 NOTE — DISCHARGE SUMMARY
HOSPITAL DISCHARGE SUMMARY - VAGINAL BIRTH    Patient Name: Jocelyn Causey   YOB: 1990  Age: 32 year old  Medical Record Number: [id number]  Primary Physician: No Ref-Primary, Physician    Admission Date:  2022  Delivery Date:    Gestational Age at Delivery:  37w 6d   Discharge Date:  2022    REASON FOR ADMISSION: Labor and Delivery    DIAGNOSIS:    1. IUP  32 year old  female delivery at 37w6d   2. APGARS at 1 min 8, at 5 min 9  3. Baby's Weight 6 lbs 7.7 oz    PROCEDURE:      CONDITIONS COMPLICATING ANTEPARTUM/POSTPARTUM:   Antepartum: GHTN, Rh negative, Failed 1 hr GCT (passed 3 hr GTT)  Intrapartum: Elevated blood pressures  Postpartum: None    SIGNIFICANT DIAGNOSTIC PROCEDURES:   None    CONSULTS:   None    HISTORY OF PRESENT ILLNESS AND HOSPITAL COURSE: This is a 32 year old  female who underwent an  at an outside facility. She was transferred to [facility] post partum because her infant was transferred to the NICU.  She had an elevated BP post partum. Labs were checked and were normal. On the day that she was discharged, vital signs were stable. Her pain was controlled, she was tolerating diet. She was voiding and passing gas.     LABS:  Lab Results   Component Value Date    HGB 11.6 (L) 2022     DISPOSITION:  Home    CONDITION AT DISCHARGE:  Good/Stable    Discharge Medications:      Review of your medicines      CONTINUE these medicines which have NOT CHANGED      Dose / Directions   breast pump Misc  Used for: Encounter for supervision of other normal pregnancy in third trimester      Dose: 1 each  1 each See Admin Instructions  Quantity: 1 each  Refills: 0     prenatal multivitamin w/iron 27-0.8 MG tablet  Indication: Pregnancy      Dose: 1 tablet  Take 1 tablet by mouth daily  Refills: 0     TYLENOL PO      Dose: 1,000 mg  Take 1,000 mg by mouth daily as needed  Refills: 0          DISCHARGE PLAN:   - Follow up with your provider, in 4-6 weeks  - Take  medication as prescribed  - Physical activity: As tolerated, no heavy lifting. Pelvic rest.  - Diet:  Regular  - Medication:  Please see MAR  - Warning signs discussed with patient about when to call the clinic/hospital  - All questions and concerns were answered for the patient prior to discharge.     Erika Jensen MD, FACOG  (P) [phone]    I saw the patient on the date of discharge  Total time spent for discharge on date of discharge: 20 minutes    Physician(s) in addition to primary physician who should receive a copy:  CC1: Dr. Vargas

## 2022-08-28 NOTE — PLAN OF CARE
Problem: Plan of Care - These are the overarching goals to be used throughout the patient stay.    Goal: Plan of Care Review/Shift Note  Outcome: Ongoing, Progressing   Patient is progressing well. VSS and afebrile. Patient has been ambulating to NICU and spending most of her time there this shift. Waiting on social work consult to figure out boarding that is closer to baby than home. Planning on discharge later this evening. Will continue to encourage ambulating and drinking fluids.

## 2022-08-28 NOTE — PROGRESS NOTES
Progress Note    Doing well. No complaints. No PIH symptoms. Normal lochia. Pain controlled. Ambulating. Voiding. Passing flatus.     Temp:  [98  F (36.7  C)-98.4  F (36.9  C)] 98  F (36.7  C)  Pulse:  [76-86] 84  Resp:  [16] 16  BP: (120-140)/(74-93) 140/85  SpO2:  [97 %-98 %] 98 %    NAD, AAO x 3  Abdomen soft, non tender  Uterus firm, below the umbilicus  No c/c/e    AST 12  ALT 12  H/H: 11.6/34.8    A/P: 33 yo PPD #2 s/p  doing well  -- She did have an elevated BP overnight. PIH labs normal.   -- Routine post partum course  -- discharge home    Erika Jensen MD, FACOG  P) [phone]

## 2022-08-28 NOTE — PLAN OF CARE
"  Problem: Plan of Care - These are the overarching goals to be used throughout the patient stay.    Goal: Plan of Care Review/Shift Note  Description: The Plan of Care Review/Shift note should be completed every shift.  The Outcome Evaluation is a brief statement about your assessment that the patient is improving, declining, or no change.  This information will be displayed automatically on your shift note.  Outcome: Adequate for Care Transition  Flowsheets (Taken 8/28/2022 2296)  Plan of Care Reviewed With:   patient   spouse  Overall Patient Progress: improving  Goal: Patient-Specific Goal (Individualized)  Description: You can add care plan individualizations to a care plan. Examples of Individualization might be:  \"Parent requests to be called daily at 9am for status\", \"I have a hard time hearing out of my right ear\", or \"Do not touch me to wake me up as it startles me\".  Outcome: Adequate for Care Transition  Goal: Absence of Hospital-Acquired Illness or Injury  Outcome: Adequate for Care Transition  Goal: Optimal Comfort and Wellbeing  Outcome: Adequate for Care Transition  Goal: Readiness for Transition of Care  Outcome: Adequate for Care Transition     Problem: Adjustment to Role Transition (Postpartum Vaginal Delivery)  Goal: Successful Maternal Role Transition  Outcome: Adequate for Care Transition     Problem: Bleeding (Postpartum Vaginal Delivery)  Goal: Hemostasis  Outcome: Adequate for Care Transition     Problem: Infection (Postpartum Vaginal Delivery)  Goal: Absence of Infection Signs/Symptoms  Outcome: Adequate for Care Transition     Problem: Pain (Postpartum Vaginal Delivery)  Goal: Acceptable Pain Control  Outcome: Adequate for Care Transition     Problem: Urinary Retention (Postpartum Vaginal Delivery)  Goal: Effective Urinary Elimination  Outcome: Adequate for Care Transition     Discharge information was printed and reviewed with pt and spouse. All questions and concerns addressed. Personal " belongings returned and escorted off unit by RN.

## 2022-08-28 NOTE — CONSULTS
INITIAL SOCIAL WORK NICU ASSESSMENT      DATA:      Reason for Social Work Consult: Accommodations for MOB     Living Situation: MOB lives with FOB and 2 children 7 and 4.      Social Support: FOB, family      Employment: Not assessed      Insurance: No concerns      Source of Financial Support: No Concerns     Mental Health History: None noted         INTERVENTION:        SW completed chart review and collaborated with the multidisciplinary team.     Psychosocial Assessment     Introduction to NICU  role and scope of practice     Discussed NICU experience     Reviewed Hospital and Community Resources     Identified stressors, barriers and family concerns     Provided support and active empathetic listening and validation.     ASSESSMENT:      Coping: Wanting Hotel close to hospital     Affect: Bright     Motivation/Ability to Access Services: Able to access services     Assessment of Support System:  Adequate      Level of engagement with SW: Engaged      Family's understanding of baby's medical situation:  Understands and hoping that baby will be in NICU a few days.      Family and parent/infant interactions: [place]       PLAN:      SWCM met and introduced self and CM services to MOB and FOB.  SWCM welcome them to NICU.  MOB stated that she is doing fine with Baby being in NICU and feels that baby will be there a couple days and will be able to bring baby home and declined weekly check ins.  MOB did ask about boarding or accommodations in the area as MOB lives an hour away and would like to be closer to baby as MOB will be discharged this evening.  SWCM called numerous Hotels in the area and gave MOB a list of 2 with better rates.  MOB had not other concerns.     POLI Trejo       98.6

## 2022-08-30 ENCOUNTER — PATIENT OUTREACH (OUTPATIENT)
Dept: CARE COORDINATION | Facility: CLINIC | Age: 32
End: 2022-08-30

## 2022-08-30 NOTE — PROGRESS NOTES
Connected Care Resource Center Contact  [facility]/Voicemail     Clinical Data: Transitional Care Management Outreach     Outreach attempted x 2.  Left message on patient's voicemail, providing [facility]'s 24/7 scheduling and nurse triage phone number [phone] ([phone]) for questions/concerns and/or to schedule an appt with an [facility] provider, if they do not have a PCP.      Plan:  [facility] will do no further outreaches at this time.       JAVY Baker  Connected Care Resource [place], [facility]    *Connected Care Resource Team does NOT follow patient ongoing. Referrals are identified based on internal discharge reports and the outreach is to ensure patient has an understanding of their discharge instructions.  
PAIN SCALE 6 OF 10.

## 2022-09-28 ENCOUNTER — MEDICAL CORRESPONDENCE (OUTPATIENT)
Dept: HEALTH INFORMATION MANAGEMENT | Facility: CLINIC | Age: 32
End: 2022-09-28

## 2022-09-28 ENCOUNTER — DOCUMENTATION ONLY (OUTPATIENT)
Dept: PEDIATRICS | Facility: CLINIC | Age: 32
End: 2022-09-28

## 2022-09-28 NOTE — PROGRESS NOTES
EPDS was done in clinic today during child's 1 month visit and score was 12 with negative psychosis and negative screen for suicidal ideation.   Jocelyn has history of postpartum depression with previous pregnancy.   Jocelyn reports that she feels she is doing better - and feels safe.    Plan:   Follow up within 1-2 weeks with OB/primary care provider was recommended - encouraged her to discuss with PCP at next appointment and sooner if feeling worse  Behavioral health referral was not placed.

## 2022-10-17 ENCOUNTER — PRENATAL OFFICE VISIT (OUTPATIENT)
Dept: OBGYN | Facility: CLINIC | Age: 32
End: 2022-10-17
Payer: COMMERCIAL

## 2022-10-17 ENCOUNTER — MEDICAL CORRESPONDENCE (OUTPATIENT)
Dept: OBGYN | Facility: CLINIC | Age: 32
End: 2022-10-17

## 2022-10-17 VITALS
DIASTOLIC BLOOD PRESSURE: 86 MMHG | HEART RATE: 96 BPM | BODY MASS INDEX: 33.62 KG/M2 | SYSTOLIC BLOOD PRESSURE: 139 MMHG | RESPIRATION RATE: 18 BRPM | WEIGHT: 227 LBS | HEIGHT: 69 IN | TEMPERATURE: 98.4 F

## 2022-10-17 DIAGNOSIS — Z23 NEED FOR PROPHYLACTIC VACCINATION AND INOCULATION AGAINST INFLUENZA: ICD-10-CM

## 2022-10-17 PROCEDURE — 90686 IIV4 VACC NO PRSV 0.5 ML IM: CPT | Performed by: STUDENT IN AN ORGANIZED HEALTH CARE EDUCATION/TRAINING PROGRAM

## 2022-10-17 PROCEDURE — 90471 IMMUNIZATION ADMIN: CPT | Performed by: STUDENT IN AN ORGANIZED HEALTH CARE EDUCATION/TRAINING PROGRAM

## 2022-10-17 PROCEDURE — 99207 PR POST PARTUM EXAM: CPT | Performed by: STUDENT IN AN ORGANIZED HEALTH CARE EDUCATION/TRAINING PROGRAM

## 2022-10-17 RX ORDER — FAMOTIDINE 20 MG
TABLET ORAL
COMMUNITY

## 2022-10-17 ASSESSMENT — ANXIETY QUESTIONNAIRES
GAD7 TOTAL SCORE: 21
5. BEING SO RESTLESS THAT IT IS HARD TO SIT STILL: NEARLY EVERY DAY
6. BECOMING EASILY ANNOYED OR IRRITABLE: NEARLY EVERY DAY
7. FEELING AFRAID AS IF SOMETHING AWFUL MIGHT HAPPEN: NEARLY EVERY DAY
3. WORRYING TOO MUCH ABOUT DIFFERENT THINGS: NEARLY EVERY DAY
1. FEELING NERVOUS, ANXIOUS, OR ON EDGE: NEARLY EVERY DAY
IF YOU CHECKED OFF ANY PROBLEMS ON THIS QUESTIONNAIRE, HOW DIFFICULT HAVE THESE PROBLEMS MADE IT FOR YOU TO DO YOUR WORK, TAKE CARE OF THINGS AT HOME, OR GET ALONG WITH OTHER PEOPLE: VERY DIFFICULT
2. NOT BEING ABLE TO STOP OR CONTROL WORRYING: NEARLY EVERY DAY
GAD7 TOTAL SCORE: 21

## 2022-10-17 ASSESSMENT — PATIENT HEALTH QUESTIONNAIRE - PHQ9
5. POOR APPETITE OR OVEREATING: NEARLY EVERY DAY
SUM OF ALL RESPONSES TO PHQ QUESTIONS 1-9: 6

## 2022-10-17 NOTE — PROGRESS NOTES
[facility]  Post Partum Office Visit    Assessment and Plan:   Jocelyn Causey is a 32 year old  who presents for a post-partum visit after  on 2022.    Postpartum care  - Appropriate post partum recovery.  - Contraction: condoms while await for vasectomy.   - Return as needed or at time interval for next routine pap, pelvic, or breast exam. Pap smear was not obtained. Continue a multi vitamin supplement, especially if breastfeeding.  - Influenza vaccine was given.     Gestational hypertension  - BP wnl today and no preE symptoms.    Eliane Del Rio MD  Office phone: [phone]  Obstetrics and Gynecology  [facility]   10/17/2022    ---------------------------------------------------------------------------------------------------------------------------  HPI: Jocelyn Causey is a 32 year old  who presents for a post-partum visit.  Patient delivered on 2022, by Dr. Todd at 37w4d gestation.  Patient delivered via , with 1st degree laceration. Her pregnancy was complicated by gestational hypertension, Rh negative status, failed GCT but passed GTT. Her delivery was uncomplicated.      Information  Name: Porfirio  Sex: female  Weight: 6 lbs. 7.7 oz. = 2940g  Complications: none  Feeding Method: exclusive pumping and bottle feeding baby    Maternal Information  Postpartum Depression: no  Resumed [place]: not yet  Last Pap Smear: 2020 NILM w/ neg HPV  Birth Control Method: planning on using condoms till vasectomy    ROS: neg. Vaginal bleeding has stopped. Denies headache, vision changes, chest pain, SOB, epigastric/RUQ pain, acute worsening in bilateral lower extremities swelling.     Physical Exam:   Vitals:    10/17/22 1432   BP: 139/86   BP Location: Right arm   Patient Position: Chair   Cuff Size: Adult Regular   Pulse: 96   Resp: 18   Temp: 98.4  F (36.9  C)   TempSrc: Tympanic   Weight: 103 kg (227 lb)   Height: 1.74 m  "(5' 8.5\")      Estimated body mass index is 34.01 kg/m  as calculated from the following:    Height as of this encounter: 1.74 m (5' 8.5\").    Weight as of this encounter: 103 kg (227 lb).    General appearance: well-hydrated, A&O x 3, no apparent distress  Lungs: Equal expansion bilaterally, no accessory muscle use  Heart: No heaves or thrills. No peripheral varicosities  Abdomen: Soft, non-tender, non-distended. No rebound, rigidity, or guarding.  Extremities: trace edema, no calf tenderness  Neuro: CN II-XII grossly intact              "

## 2022-10-17 NOTE — NURSING NOTE
"Initial /86 (BP Location: Right arm, Patient Position: Chair, Cuff Size: Adult Regular)   Pulse 96   Temp 98.4  F (36.9  C) (Tympanic)   Resp 18   Ht 1.74 m (5' 8.5\")   Wt 103 kg (227 lb)   LMP 12/06/2021   Breastfeeding Yes   BMI 34.01 kg/m   Estimated body mass index is 34.01 kg/m  as calculated from the following:    Height as of this encounter: 1.74 m (5' 8.5\").    Weight as of this encounter: 103 kg (227 lb). .      "

## 2022-11-09 ENCOUNTER — MEDICAL CORRESPONDENCE (OUTPATIENT)
Dept: HEALTH INFORMATION MANAGEMENT | Facility: CLINIC | Age: 32
End: 2022-11-09

## 2023-04-06 NOTE — PROGRESS NOTES
SUBJECTIVE:    Is a pregnancy test required: Yes.  Was it positive or negative?  Negative  Was a consent obtained?  Yes    Subjective: Jocelyn Causey is a 30 year old  presents for IUD and desires Kailey type IUD.  She requests removal of the IUD because the IUD effectiveness has     Patient has been given the opportunity to ask questions about all forms of birth control, including all options appropriate for Jocelyn Causey. Discussed that no method of birth control, except abstinence is 100% effective against pregnancy or sexually transmitted infection.     Jocelyn Causey understands she may have the IUD removed at any time. IUD should be removed by a health care provider and the current IUD will be removed today.    The entire removal and insertion procedure was reviewed with the patient, including care after placement.    She also is due for a pap/      PROCEDURE:    A speculum exam was performed and the cervix was visualized. The IUD string was not visualized. Using ring forceps, the string  was grasped and the IUD removed intact.    Pap was collected.      Under sterile technique, cervix was visualized with speculum and prepped with Betadine solution swab x 3. Tenaculum was placed for stability. The uterus was gently straightened and sounded to 7.5 cm. IUD prepared for placement, and IUD inserted according to 's instructions without difficulty or significant ressitance, and deployed at the fundus. The strings were visualized and trimmed to 2.5 cm from the external os. Tenaculum was removed and hemostasis noted. Speculum removed.  Patient tolerated procedure well.    EBL: minimal    Complications: none      POST PROCEDURE:    Given 's handouts, including when to have IUD removed, list of danger s/sx, side effects and follow up recommended. Encouraged condom use for prevention of STD. Advised to call for any fever, for prolonged or severe pain or bleeding, abnormal  Please return to the ER for behavioral health admission. vaginal dischage, or unable to palpate strings. She was advised to use pain medications (ibuprofen) as needed for mild to moderate pain. Advised to follow-up in clinic in 4-6 weeks for IUD string check if unable to find strings or as directed by provider.     Mily Gordon CNM

## 2023-06-01 ENCOUNTER — HEALTH MAINTENANCE LETTER (OUTPATIENT)
Age: 33
End: 2023-06-01

## 2023-10-25 ENCOUNTER — LAB (OUTPATIENT)
Dept: LAB | Facility: CLINIC | Age: 33
End: 2023-10-25
Attending: PHYSICIAN ASSISTANT
Payer: COMMERCIAL

## 2023-10-25 ENCOUNTER — E-VISIT (OUTPATIENT)
Dept: URGENT CARE | Facility: CLINIC | Age: 33
End: 2023-10-25
Payer: COMMERCIAL

## 2023-10-25 DIAGNOSIS — J02.9 SORE THROAT: Primary | ICD-10-CM

## 2023-10-25 DIAGNOSIS — J02.9 SORE THROAT: ICD-10-CM

## 2023-10-25 LAB
DEPRECATED S PYO AG THROAT QL EIA: NEGATIVE
GROUP A STREP BY PCR: NOT DETECTED

## 2023-10-25 PROCEDURE — 99421 OL DIG E/M SVC 5-10 MIN: CPT | Performed by: PHYSICIAN ASSISTANT

## 2023-10-25 PROCEDURE — 87651 STREP A DNA AMP PROBE: CPT

## 2023-10-25 NOTE — PATIENT INSTRUCTIONS
Dear Jocelyn,    I ordered a strep test for you.     To schedule: go to your [employer] home page and scroll down to the section that says  You have an appointment that needs to be scheduled  and click the large green button that says  Schedule Now  and follow the steps to find the next available openings.    If you are unable to complete these [employer] scheduling steps, please call [phone] to schedule your testing.

## 2023-11-28 ENCOUNTER — ANCILLARY PROCEDURE (OUTPATIENT)
Dept: GENERAL RADIOLOGY | Facility: CLINIC | Age: 33
End: 2023-11-28
Attending: PHYSICIAN ASSISTANT
Payer: COMMERCIAL

## 2023-11-28 ENCOUNTER — OFFICE VISIT (OUTPATIENT)
Dept: URGENT CARE | Facility: URGENT CARE | Age: 33
End: 2023-11-28
Payer: COMMERCIAL

## 2023-11-28 VITALS
BODY MASS INDEX: 28.77 KG/M2 | HEART RATE: 108 BPM | WEIGHT: 192 LBS | RESPIRATION RATE: 16 BRPM | TEMPERATURE: 100.7 F | SYSTOLIC BLOOD PRESSURE: 136 MMHG | DIASTOLIC BLOOD PRESSURE: 95 MMHG | OXYGEN SATURATION: 96 %

## 2023-11-28 DIAGNOSIS — R05.1 ACUTE COUGH: ICD-10-CM

## 2023-11-28 DIAGNOSIS — J01.90 ACUTE SINUSITIS WITH SYMPTOMS > 10 DAYS: Primary | ICD-10-CM

## 2023-11-28 DIAGNOSIS — R50.9 FEVER, UNSPECIFIED: ICD-10-CM

## 2023-11-28 DIAGNOSIS — R07.0 THROAT PAIN: ICD-10-CM

## 2023-11-28 LAB
DEPRECATED S PYO AG THROAT QL EIA: NEGATIVE
GROUP A STREP BY PCR: NOT DETECTED

## 2023-11-28 PROCEDURE — 87635 SARS-COV-2 COVID-19 AMP PRB: CPT | Performed by: PHYSICIAN ASSISTANT

## 2023-11-28 PROCEDURE — 71046 X-RAY EXAM CHEST 2 VIEWS: CPT | Mod: TC | Performed by: RADIOLOGY

## 2023-11-28 PROCEDURE — 87651 STREP A DNA AMP PROBE: CPT | Performed by: PHYSICIAN ASSISTANT

## 2023-11-28 PROCEDURE — 99214 OFFICE O/P EST MOD 30 MIN: CPT | Performed by: PHYSICIAN ASSISTANT

## 2023-11-28 RX ORDER — ALBUTEROL SULFATE 90 UG/1
AEROSOL, METERED RESPIRATORY (INHALATION)
Qty: 18 G | Refills: 0 | Status: SHIPPED | OUTPATIENT
Start: 2023-11-28

## 2023-11-28 RX ORDER — SPIRONOLACTONE 25 MG/1
25 TABLET ORAL DAILY
COMMUNITY

## 2023-11-28 RX ORDER — PREDNISONE 20 MG/1
40 TABLET ORAL DAILY
Qty: 10 TABLET | Refills: 0 | Status: SHIPPED | OUTPATIENT
Start: 2023-11-28 | End: 2023-12-03

## 2023-11-28 NOTE — PROGRESS NOTES
Assessment & Plan     Acute sinusitis with symptoms > 10 days  Will treat with Augmentin twice daily x 7 days. Get plenty of rest and push fluids. Continue with supportive care. Follow up as needed.   - amoxicillin-clavulanate (AUGMENTIN) 875-125 MG tablet; Take 1 tablet by mouth 2 times daily for 7 days    Acute cough    - XR Chest 2 Views; Future  - predniSONE (DELTASONE) 20 MG tablet; Take 2 tablets (40 mg) by mouth daily for 5 days  - albuterol (PROAIR HFA/PROVENTIL HFA/VENTOLIN HFA) 108 (90 Base) MCG/ACT inhaler; Inhale 2 puffs every 4-6 hours as needed for cough, wheezing, or shortness of breath  - Symptomatic COVID-19 Virus (Coronavirus) by PCR Nose    Throat pain    - Streptococcus A Rapid Screen w/Reflex to PCR - Clinic Collect  - Group A Streptococcus PCR Throat Swab    Fever, unspecified    - XR Chest 2 Views; Future  - Symptomatic COVID-19 Virus (Coronavirus) by PCR Nose                 Return in about 1 week (around 12/5/2023), or if symptoms worsen or fail to improve.    Tracie Haque PA-C  [facility] [place]    Subjective   Chief Complaint   Patient presents with    URI     Cough, congestion, throat pain, body aches, chills, sweat x 4-5 days. Kids had RSV, influenza B, and strep. Pt tested at work for flu and covid, both were negative.       HPI     URI Adult    Onset of symptoms was several weeks of coughing.  Course of illness is same.    Severity moderate  Current and Associated symptoms: fever x 2 days, cough, sore throat, sinus pain/pressure, colored drainage   Treatment measures tried include OTC Cough med.  Predisposing factors include None.                  Review of Systems         Objective    BP (!) 136/95   Pulse 108   Temp (!) 100.7  F (38.2  C) (Tympanic)   Resp 16   Wt 87.1 kg (192 lb)   LMP 11/20/2023 (Approximate)   SpO2 96%   Breastfeeding Yes   BMI 28.77 kg/m    Body mass index is 28.77 kg/m .  Physical Exam  Constitutional:       Appearance: She  is well-developed.   HENT:      Head: Normocephalic.      Right Ear: Tympanic membrane and ear canal normal.      Left Ear: Tympanic membrane and ear canal normal.   Eyes:      Conjunctiva/sclera: Conjunctivae normal.   Cardiovascular:      Rate and Rhythm: Normal rate.      Heart sounds: Normal heart sounds.   Pulmonary:      Effort: Pulmonary effort is normal.      Breath sounds: Normal breath sounds.   Skin:     General: Skin is warm and dry.      Findings: No rash.   Psychiatric:         Behavior: Behavior normal.            Xray - Reviewed and interpreted by me.  No acute infiltrate

## 2023-11-28 NOTE — LETTER
November 28, 2023      Jocelyn Causey  [id number] [address] [id number]        To Whom It May Concern:    Jocelyn Causey  was seen and treated in clinic. Please excuse from work 11/28/23 and 11/29/23.        Sincerely,          Tracie Haque PA-C

## 2023-11-29 LAB — SARS-COV-2 RNA RESP QL NAA+PROBE: NEGATIVE

## 2024-06-16 ENCOUNTER — HEALTH MAINTENANCE LETTER (OUTPATIENT)
Age: 34
End: 2024-06-16

## 2024-10-14 ENCOUNTER — LAB (OUTPATIENT)
Dept: FAMILY MEDICINE | Facility: CLINIC | Age: 34
End: 2024-10-14
Attending: PHYSICIAN ASSISTANT
Payer: COMMERCIAL

## 2024-10-14 ENCOUNTER — E-VISIT (OUTPATIENT)
Dept: URGENT CARE | Facility: CLINIC | Age: 34
End: 2024-10-14
Payer: COMMERCIAL

## 2024-10-14 DIAGNOSIS — J02.9 SORE THROAT: Primary | ICD-10-CM

## 2024-10-14 DIAGNOSIS — J02.9 SORE THROAT: ICD-10-CM

## 2024-10-14 DIAGNOSIS — J02.0 STREP THROAT: ICD-10-CM

## 2024-10-14 LAB
DEPRECATED S PYO AG THROAT QL EIA: POSITIVE
SARS-COV-2 RNA RESP QL NAA+PROBE: NEGATIVE

## 2024-10-14 PROCEDURE — 99421 OL DIG E/M SVC 5-10 MIN: CPT | Performed by: PHYSICIAN ASSISTANT

## 2024-10-14 PROCEDURE — 87880 STREP A ASSAY W/OPTIC: CPT

## 2024-10-14 PROCEDURE — 87635 SARS-COV-2 COVID-19 AMP PRB: CPT

## 2024-10-14 RX ORDER — AMOXICILLIN 500 MG/1
500 CAPSULE ORAL 2 TIMES DAILY
Qty: 20 CAPSULE | Refills: 0 | Status: SHIPPED | OUTPATIENT
Start: 2024-10-14 | End: 2024-10-24

## 2024-10-14 NOTE — PATIENT INSTRUCTIONS
David Banks,    After reviewing your responses, I would like you to come in for a swab to make sure we treat you correctly. This swab is to evaluate you for possible COVID and Strep Throat, and should be scheduled for today or tomorrow. Please use the Schedule Now button in [employer] to schedule your swab. Otherwise, click this link to schedule a lab only appointment.    Lab appointments are not available at most locations on the weekends. If no Lab Only appointment is available, you should be seen in any of our convenient Urgent Care Centers for an in person visit, which can be found on our website here.    You will receive instructions with your results in [employer] once they are available.     If your symptoms worsen, you develop difficulty breathing, difficulty with drinking enough to stay hydrated, difficulty swallowing your saliva or have fevers for more than 5 days, please contact your primary care provider for an appointment or visit an Urgent Care Center to be seen.      Thanks again for choosing us as your health care partner.   Jazz De Oliveira PA-C  Sore Throat: Care Instructions  Overview     Infection by bacteria or a virus causes most sore throats. Cigarette smoke, dry air, air pollution, allergies, and yelling can also cause a sore throat. Sore throats can be painful and annoying. Fortunately, most sore throats go away on their own. If you have a bacterial infection, your doctor may prescribe antibiotics.  Follow-up care is a key part of your treatment and safety. Be sure to make and go to all appointments, and call your doctor if you are having problems. It's also a good idea to know your test results and keep a list of the medicines you take.  How can you care for yourself at home?  If your doctor prescribed antibiotics, take them as directed. Do not stop taking them just because you feel better. You need to take the full course of antibiotics.  Gargle with warm salt water several times a day to help  "reduce swelling and relieve pain. Mix 1/2 teaspoon of salt in 1 cup of warm water.  Take an over-the-counter pain medicine, such as acetaminophen (Tylenol), ibuprofen (Advil, Motrin), or naproxen (Aleve). Read and follow all instructions on the label.  Be careful when taking over-the-counter cold or flu medicines and Tylenol at the same time. Many of these medicines have acetaminophen, which is Tylenol. Read the labels to make sure that you are not taking more than the recommended dose. Too much acetaminophen (Tylenol) can be harmful.  Drink plenty of fluids. Fluids may help soothe an irritated throat. Hot fluids, such as tea or soup, may help decrease throat pain.  Use over-the-counter throat lozenges to soothe pain. Regular cough drops or hard candy may also help. These should not be given to young children because of the risk of choking.  Do not smoke or allow others to smoke around you. If you need help quitting, talk to your doctor about stop-smoking programs and medicines. These can increase your chances of quitting for good.  Use a vaporizer or humidifier to add moisture to your bedroom. Follow the directions for cleaning the machine.  When should you call for help?   Call your doctor now or seek immediate medical care if:    You have trouble breathing.     Your sore throat gets much worse on one side.     You have new or worse trouble swallowing.     You have a new or higher fever.   Watch closely for changes in your health, and be sure to contact your doctor if you do not get better as expected.  Where can you learn more?  Go to https://www.[employer].net/patiented  Enter U420 in the search box to learn more about \"Sore Throat: Care Instructions.\"  Current as of: September 27, 2023  Content Version: 14.2 2024 [facility] [employer].   Care instructions adapted under license by your healthcare professional. If you have questions about a medical condition or this instruction, always ask your healthcare " professional. [employer] disclaims any warranty or liability for your use of this information.

## 2024-10-25 NOTE — PROGRESS NOTES
Pt ambulated with staff to [facility]. All belongings sent to PP room with . Pt had large spontaneous void prior to transfer. Pt bonding well with baby in [facility].     Zoë Weeks RN 11/1/2017 8:33 PM        ambulatory

## 2025-06-21 ENCOUNTER — HEALTH MAINTENANCE LETTER (OUTPATIENT)
Age: 35
End: 2025-06-21